# Patient Record
Sex: FEMALE | Race: WHITE | NOT HISPANIC OR LATINO | Employment: OTHER | URBAN - NONMETROPOLITAN AREA
[De-identification: names, ages, dates, MRNs, and addresses within clinical notes are randomized per-mention and may not be internally consistent; named-entity substitution may affect disease eponyms.]

---

## 2017-01-23 RX ORDER — LOVASTATIN 10 MG/1
10 TABLET ORAL NIGHTLY
Qty: 90 TABLET | Refills: 1 | Status: SHIPPED | OUTPATIENT
Start: 2017-01-23 | End: 2017-09-06 | Stop reason: SINTOL

## 2017-04-12 ENCOUNTER — OFFICE VISIT (OUTPATIENT)
Dept: ORTHOPEDIC SURGERY | Facility: CLINIC | Age: 67
End: 2017-04-12

## 2017-04-12 VITALS — WEIGHT: 175 LBS | BODY MASS INDEX: 28.12 KG/M2 | HEIGHT: 66 IN

## 2017-04-12 DIAGNOSIS — IMO0001 VERTEBRAL COMPRESSION FRACTURE, INITIAL ENCOUNTER: Primary | ICD-10-CM

## 2017-04-12 DIAGNOSIS — M54.5 ACUTE LOW BACK PAIN, UNSPECIFIED BACK PAIN LATERALITY, WITH SCIATICA PRESENCE UNSPECIFIED: ICD-10-CM

## 2017-04-12 PROBLEM — M48.50XA VERTEBRAL COMPRESSION FRACTURE (HCC): Status: ACTIVE | Noted: 2017-04-12

## 2017-04-12 PROBLEM — M54.50 ACUTE LOW BACK PAIN: Status: ACTIVE | Noted: 2017-04-12

## 2017-04-12 PROCEDURE — 99202 OFFICE O/P NEW SF 15 MIN: CPT | Performed by: ORTHOPAEDIC SURGERY

## 2017-04-12 NOTE — PROGRESS NOTES
Subjective   Karey Cazares is a 67 y.o.  y.o. female    Chief Complaint   Patient presents with   • Lumbar Spine - Establish Care, Pain   • Results     xrays @        Fall   The accident occurred 3 to 5 days ago. The fall occurred from a ladder. She fell from a height of 3 to 5 ft. There was no blood loss. Point of impact: back. Pain location: back. The pain is at a severity of 7/10. The pain is moderate. The symptoms are aggravated by sitting and standing. Pertinent negatives include no abdominal pain, fever, headaches, hematuria, nausea or numbness. Associated symptoms comments: Sharp pain with burning. She has tried acetaminophen and rest for the symptoms. The treatment provided mild relief.     Fell, injured back.        Review of Systems   Constitutional: Negative for appetite change, chills, diaphoresis, fatigue, fever and unexpected weight change.   HENT: Negative.  Negative for congestion, dental problem, facial swelling, hearing loss, nosebleeds, postnasal drip, trouble swallowing and voice change.    Eyes: Negative.  Negative for pain, discharge, redness and itching.   Respiratory: Negative.  Negative for cough, choking, chest tightness, shortness of breath, wheezing and stridor.    Cardiovascular: Negative.  Negative for chest pain, palpitations and leg swelling.   Gastrointestinal: Negative.  Negative for abdominal pain, blood in stool, constipation, diarrhea and nausea.   Endocrine: Negative.  Negative for cold intolerance and heat intolerance.   Genitourinary: Negative.  Negative for dysuria, frequency, hematuria and urgency.   Musculoskeletal: Positive for back pain. Negative for gait problem, joint swelling, neck pain and neck stiffness.   Skin: Negative.  Negative for pallor and rash.   Allergic/Immunologic: Negative.    Neurological: Negative for syncope, facial asymmetry, speech difficulty, weakness, numbness and headaches.   Hematological: Negative.    Psychiatric/Behavioral: Negative.   "Negative for agitation, behavioral problems, confusion, decreased concentration, dysphoric mood and hallucinations. The patient is not nervous/anxious and is not hyperactive.    All other systems reviewed and are negative.      Allergies   Allergen Reactions   • Erythromycin Base    • Keflex [Cephalexin] Rash   • Lortab [Hydrocodone-Acetaminophen] Rash   • Orudis [Ketoprofen] Rash   • Other Rash     Phenobarbital and Darvocet-N 100   • Sulfa Antibiotics Rash         Current Outpatient Prescriptions:   •  aspirin 81 MG tablet, Take 81 mg by mouth daily., Disp: , Rfl:   •  azelastine (OPTIVAR) 0.05 % ophthalmic solution, Administer 1 drop to both eyes 2 (two) times a day., Disp: , Rfl:   •  Ergocalciferol 2000 UNITS tablet, Take 1 tablet by mouth daily., Disp: , Rfl:   •  lovastatin (MEVACOR) 10 MG tablet, Take 1 tablet by mouth Every Night., Disp: 90 tablet, Rfl: 1  •  Zinc Acetate (GALZIN) 25 MG capsule, Take 1 capsule by mouth daily., Disp: , Rfl:     Past Surgical History:   Procedure Laterality Date   • INJECTION OF MEDICATION  01/17/2016    Dexamethasone   • INJECTION OF MEDICATION  01/17/2016    INJECTION FOR NEUROMA   • INJECTION OF MEDICATION      kenalog x2- preformed on 01/17/2016 and 01/20/2011       Past Medical History:   Diagnosis Date   • Acute allergic reaction    • Acute otitis externa    • Acute sinusitis    • Acute urinary tract infection    • Allergic conjunctivitis    • Closed fracture of foot    • Cough    • Encounter for immunization    • Exanthematous disorder    • H/O screening mammography 03/12/2015   • Hyperlipidemia    • Kellogg's metatarsalgia    • Pain in lower limb    • Pain in throat    • Screening for osteoporosis 03/12/2015   • Upper respiratory infection    • Urinary tract infectious disease    • Viral disease    • Vitamin D deficiency        Vitals:    04/12/17 1010   Weight: 175 lb (79.4 kg)   Height: 66\" (167.6 cm)       Objective     Physical Exam   Musculoskeletal:        " Thoracic back: She exhibits decreased range of motion and tenderness. She exhibits no bony tenderness, no swelling, no edema, no deformity, no laceration, no pain, no spasm and normal pulse.        Lumbar back: She exhibits tenderness. She exhibits no bony tenderness, no swelling, no edema, no deformity, no laceration, no pain and no spasm.           Assessment:  Karey was seen today for results, establish care and pain.    Diagnoses and all orders for this visit:    Acute low back pain, unspecified back pain laterality, with sciatica presence unspecified    Vertebral compression fracture, initial encounter    5views lumbar spine impression:   1. Generalized decreased bony minerlization with a mild compression deformity at [id number] in 30% vertical krystyna loss. An acute etiology is suspected given the patients history.  2. Levoscoliosis of the lower thoracic and upper lumbar spine.    Plan:    Active Ambulatory Problems     Diagnosis Date Noted   • Acute low back pain 04/12/2017   • Vertebral compression fracture 04/12/2017     Resolved Ambulatory Problems     Diagnosis Date Noted   • No Resolved Ambulatory Problems     Past Medical History:   Diagnosis Date   • Acute allergic reaction    • Acute otitis externa    • Acute sinusitis    • Acute urinary tract infection    • Allergic conjunctivitis    • Closed fracture of foot    • Cough    • Encounter for immunization    • Exanthematous disorder    • H/O screening mammography 03/12/2015   • Hyperlipidemia    • Kellogg's metatarsalgia    • Pain in lower limb    • Pain in throat    • Screening for osteoporosis 03/12/2015   • Upper respiratory infection    • Urinary tract infectious disease    • Viral disease    • Vitamin D deficiency        I discussed treatment options.  Bracing is an option, I explained to her the necessity for bracing, which she will consider.          Signed by, Kev Liang MD

## 2017-04-14 ENCOUNTER — TELEPHONE (OUTPATIENT)
Dept: ORTHOPEDIC SURGERY | Facility: CLINIC | Age: 67
End: 2017-04-14

## 2017-04-14 NOTE — TELEPHONE ENCOUNTER
Patient has received the brace you ordered is too restrictive. She is unable to even go the restroom with out help. She can not wear it. Can you order a different one.

## 2017-04-17 NOTE — TELEPHONE ENCOUNTER
Any other brace I may order would be the same thing.  I'm not going to waste more money for her to decide she doesn't want to wear it.

## 2017-05-12 ENCOUNTER — OFFICE VISIT (OUTPATIENT)
Dept: ORTHOPEDIC SURGERY | Facility: CLINIC | Age: 67
End: 2017-05-12

## 2017-05-12 VITALS — WEIGHT: 175 LBS | BODY MASS INDEX: 28.12 KG/M2 | HEIGHT: 66 IN

## 2017-05-12 DIAGNOSIS — IMO0001 VERTEBRAL COMPRESSION FRACTURE, WITH ROUTINE HEALING, SUBSEQUENT ENCOUNTER: Primary | ICD-10-CM

## 2017-05-12 DIAGNOSIS — M54.5 ACUTE LOW BACK PAIN, UNSPECIFIED BACK PAIN LATERALITY, WITH SCIATICA PRESENCE UNSPECIFIED: ICD-10-CM

## 2017-05-12 PROCEDURE — 99213 OFFICE O/P EST LOW 20 MIN: CPT | Performed by: ORTHOPAEDIC SURGERY

## 2017-07-05 DIAGNOSIS — Z11.59 NEED FOR HEPATITIS C SCREENING TEST: ICD-10-CM

## 2017-07-05 DIAGNOSIS — E78.2 MIXED HYPERLIPIDEMIA: Primary | ICD-10-CM

## 2017-09-06 ENCOUNTER — OFFICE VISIT (OUTPATIENT)
Dept: FAMILY MEDICINE CLINIC | Facility: CLINIC | Age: 67
End: 2017-09-06

## 2017-09-06 ENCOUNTER — LAB (OUTPATIENT)
Dept: LAB | Facility: HOSPITAL | Age: 67
End: 2017-09-06

## 2017-09-06 VITALS
BODY MASS INDEX: 27.85 KG/M2 | HEIGHT: 66 IN | DIASTOLIC BLOOD PRESSURE: 78 MMHG | SYSTOLIC BLOOD PRESSURE: 135 MMHG | HEART RATE: 83 BPM | OXYGEN SATURATION: 98 % | WEIGHT: 173.3 LBS

## 2017-09-06 DIAGNOSIS — Z00.00 MEDICARE ANNUAL WELLNESS VISIT, INITIAL: Primary | ICD-10-CM

## 2017-09-06 DIAGNOSIS — E78.2 MIXED HYPERLIPIDEMIA: ICD-10-CM

## 2017-09-06 DIAGNOSIS — E55.9 VITAMIN D DEFICIENCY: Chronic | ICD-10-CM

## 2017-09-06 DIAGNOSIS — IMO0001 VERTEBRAL COMPRESSION FRACTURE, WITH ROUTINE HEALING, SUBSEQUENT ENCOUNTER: ICD-10-CM

## 2017-09-06 DIAGNOSIS — Z11.59 NEED FOR HEPATITIS C SCREENING TEST: ICD-10-CM

## 2017-09-06 DIAGNOSIS — E78.2 MIXED HYPERLIPIDEMIA: Chronic | ICD-10-CM

## 2017-09-06 LAB
ALBUMIN SERPL-MCNC: 4.6 G/DL (ref 3.4–4.8)
ALBUMIN/GLOB SERPL: 1.4 G/DL (ref 1.1–1.8)
ALP SERPL-CCNC: 68 U/L (ref 38–126)
ALT SERPL W P-5'-P-CCNC: 51 U/L (ref 9–52)
ANION GAP SERPL CALCULATED.3IONS-SCNC: 12 MMOL/L (ref 5–15)
ARTICHOKE IGE QN: 180 MG/DL (ref 1–129)
AST SERPL-CCNC: 41 U/L (ref 14–36)
BACTERIA UR QL AUTO: ABNORMAL /HPF
BILIRUB SERPL-MCNC: 0.6 MG/DL (ref 0.2–1.3)
BILIRUB UR QL STRIP: NEGATIVE
BUN BLD-MCNC: 17 MG/DL (ref 7–21)
BUN/CREAT SERPL: 17 (ref 7–25)
CALCIUM SPEC-SCNC: 9.7 MG/DL (ref 8.4–10.2)
CHLORIDE SERPL-SCNC: 103 MMOL/L (ref 95–110)
CHOLEST SERPL-MCNC: 292 MG/DL (ref 0–199)
CLARITY UR: CLEAR
CO2 SERPL-SCNC: 27 MMOL/L (ref 22–31)
COLOR UR: YELLOW
CREAT BLD-MCNC: 1 MG/DL (ref 0.5–1)
GFR SERPL CREATININE-BSD FRML MDRD: 55 ML/MIN/1.73 (ref 45–104)
GLOBULIN UR ELPH-MCNC: 3.2 GM/DL (ref 2.3–3.5)
GLUCOSE BLD-MCNC: 102 MG/DL (ref 60–100)
GLUCOSE UR STRIP-MCNC: NEGATIVE MG/DL
HCV AB SER DONR QL: NEGATIVE
HDLC SERPL-MCNC: 62 MG/DL (ref 60–200)
HGB UR QL STRIP.AUTO: NEGATIVE
HYALINE CASTS UR QL AUTO: ABNORMAL /LPF
KETONES UR QL STRIP: NEGATIVE
LDLC/HDLC SERPL: 3.01 {RATIO} (ref 0–3.22)
LEUKOCYTE ESTERASE UR QL STRIP.AUTO: NEGATIVE
NITRITE UR QL STRIP: NEGATIVE
PH UR STRIP.AUTO: 5.5 [PH] (ref 5–9)
POTASSIUM BLD-SCNC: 4.8 MMOL/L (ref 3.5–5.1)
PROT SERPL-MCNC: 7.8 G/DL (ref 6.3–8.6)
PROT UR QL STRIP: NEGATIVE
RBC # UR: ABNORMAL /HPF
REF LAB TEST METHOD: ABNORMAL
SODIUM BLD-SCNC: 142 MMOL/L (ref 137–145)
SP GR UR STRIP: 1.02 (ref 1–1.03)
SQUAMOUS #/AREA URNS HPF: ABNORMAL /HPF
TRIGL SERPL-MCNC: 218 MG/DL (ref 20–199)
UROBILINOGEN UR QL STRIP: NORMAL
WBC UR QL AUTO: ABNORMAL /HPF

## 2017-09-06 PROCEDURE — 81001 URINALYSIS AUTO W/SCOPE: CPT | Performed by: GENERAL PRACTICE

## 2017-09-06 PROCEDURE — 80061 LIPID PANEL: CPT | Performed by: GENERAL PRACTICE

## 2017-09-06 PROCEDURE — 80053 COMPREHEN METABOLIC PANEL: CPT | Performed by: GENERAL PRACTICE

## 2017-09-06 PROCEDURE — 86803 HEPATITIS C AB TEST: CPT | Performed by: GENERAL PRACTICE

## 2017-09-06 PROCEDURE — G0438 PPPS, INITIAL VISIT: HCPCS | Performed by: GENERAL PRACTICE

## 2017-09-06 PROCEDURE — 36415 COLL VENOUS BLD VENIPUNCTURE: CPT

## 2017-09-06 PROCEDURE — 99214 OFFICE O/P EST MOD 30 MIN: CPT | Performed by: GENERAL PRACTICE

## 2017-09-06 RX ORDER — DIAPER,BRIEF,ADULT, DISPOSABLE
EACH MISCELLANEOUS
COMMUNITY
End: 2018-09-07

## 2017-09-06 RX ORDER — PRAVASTATIN SODIUM 20 MG
20 TABLET ORAL NIGHTLY
Qty: 90 TABLET | Refills: 3 | Status: SHIPPED | OUTPATIENT
Start: 2017-09-06 | End: 2018-09-07 | Stop reason: SDUPTHER

## 2017-09-06 RX ORDER — ASPIRIN 325 MG
650 TABLET ORAL DAILY
COMMUNITY
End: 2018-09-07 | Stop reason: ALTCHOICE

## 2017-09-06 NOTE — PROGRESS NOTES
QUICK REFERENCE INFORMATION:  The ABCs of the Annual Wellness Visit    Initial Medicare Wellness Visit    HEALTH RISK ASSESSMENT    1950    Recent Hospitalizations:  No hospitalization(s) within the last year..    Current Medical Providers:  Patient Care Team:  Tatiana Paredes MD as PCP - General  Hosea Srinivasan OD (Optometry)    Smoking Status:  History   Smoking Status   • Never Smoker   Smokeless Tobacco   • Never Used       Alcohol Consumption:  History   Alcohol Use No       Depression Screen:   PHQ-2/PHQ-9 Depression Screening 9/6/2017   Little interest or pleasure in doing things 0   Feeling down, depressed, or hopeless 1   Trouble falling or staying asleep, or sleeping too much 0   Feeling tired or having little energy 0   Poor appetite or overeating 1   Feeling bad about yourself - or that you are a failure or have let yourself or your family down 0   Trouble concentrating on things, such as reading the newspaper or watching television 0   Moving or speaking so slowly that other people could have noticed. Or the opposite - being so fidgety or restless that you have been moving around a lot more than usual 0   Thoughts that you would be better off dead, or of hurting yourself in some way 0   Total Score 2   If you checked off any problems, how difficult have these problems made it for you to do your work, take care of things at home, or get along with other people? Not difficult at all       Health Habits and Functional and Cognitive Screening:  Functional & Cognitive Status 9/6/2017   Do you have difficulty preparing food and eating? No   Do you have difficulty bathing yourself? No   Do you have difficulty getting dressed? No   Do you have difficulty using the toilet? No   Do you have difficulty moving around from place to place? No   In the past year have you fallen or experienced a near fall? Yes   Do you need help using the phone?  No   Are you deaf or do you have serious difficulty hearing?   No   Do you need help with transportation? No   Do you need help shopping? No   Do you need help preparing meals?  No   Do you need help with housework?  No   Do you need help with laundry? No   Do you need help taking your medications? No   Do you need help managing money? No   Do you have difficulty concentrating, remembering or making decisions? No       Health Habits  Current Diet: Well Balanced Diet  Dental Exam: Unknown  Eye Exam: Up to date  Exercise (times per week): 0 times per week      Does the patient have evidence of cognitive impairment? No    Asiprin use counseling: Taking ASA appropriately as indicated      Recent Lab Results:    Visual Acuity:  No exam data present    Age-appropriate Screening Schedule:  Refer to the list below for future screening recommendations based on patient's age, sex and/or medical conditions. Orders for these recommended tests are listed in the plan section. The patient has been provided with a written plan.    Health Maintenance   Topic Date Due   • COLONOSCOPY  11/16/2017   • MAMMOGRAM  03/17/2018   • LIPID PANEL  09/06/2018   • TDAP/TD VACCINES (2 - Td) 08/02/2027   • INFLUENZA VACCINE  Completed   • PNEUMOCOCCAL VACCINES (65+ LOW/MEDIUM RISK)  Completed   • ZOSTER VACCINE  Completed        Subjective   History of Present Illness    Karey Cazares is a 67 y.o. female who presents for an Annual Wellness Visit.    The following portions of the patient's history were reviewed and updated as appropriate: allergies, current medications, past family history, past medical history, past social history, past surgical history and problem list.    Outpatient Medications Prior to Visit   Medication Sig Dispense Refill   • azelastine (OPTIVAR) 0.05 % ophthalmic solution Administer 1 drop to both eyes 2 (two) times a day.     • Ergocalciferol 2000 UNITS tablet Take 1 tablet by mouth daily.     • IBUPROFEN IB PO Take  by mouth.     • Zinc Acetate (GALZIN) 25 MG capsule Take 1  "capsule by mouth daily.     • aspirin 81 MG tablet Take 81 mg by mouth daily.     • lovastatin (MEVACOR) 10 MG tablet Take 1 tablet by mouth Every Night. 90 tablet 1     No facility-administered medications prior to visit.        Patient Active Problem List   Diagnosis   • Acute low back pain   • Vertebral compression fracture   • Mixed hyperlipidemia       Advance Care Planning:  has an advance directive - a copy has been provided and is in file    Identification of Risk Factors:  Risk factors include: weight , unhealthy diet, inactivity and chronic pain.    Review of Systems    Compared to one year ago, the patient feels her physical health is worse.  Compared to one year ago, the patient feels her mental health is the same.    Objective     Physical Exam   Constitutional: She is oriented to person, place, and time. She appears well-developed and well-nourished. No distress.   HENT:   Head: Normocephalic and atraumatic.   Nose: Nose normal.   Mouth/Throat: Oropharynx is clear and moist.   Eyes: Conjunctivae and EOM are normal. Pupils are equal, round, and reactive to light. Right eye exhibits no discharge. Left eye exhibits no discharge.   Neck: No thyromegaly present.   Cardiovascular: Normal rate, regular rhythm, normal heart sounds and intact distal pulses.    Pulmonary/Chest: Effort normal and breath sounds normal.   Lymphadenopathy:     She has no cervical adenopathy.   Neurological: She is alert and oriented to person, place, and time.   Skin: Skin is warm and dry.   Psychiatric: She has a normal mood and affect.   Nursing note and vitals reviewed.      Vitals:    09/06/17 0853   BP: 135/78   BP Location: Left arm   Patient Position: Sitting   Cuff Size: Adult   Pulse: 83   SpO2: 98%   Weight: 173 lb 4.8 oz (78.6 kg)   Height: 66\" (167.6 cm)   PainSc:   2   PainLoc: Back       Body mass index is 27.97 kg/(m^2).  Discussed the patient's BMI with her. The BMI is above average; BMI management plan is " completed.    Assessment/Plan   Patient Self-Management and Personalized Health Advice  The patient has been provided with information about: diet, exercise and weight management and preventive services including:   · Exercise counseling provided, Fall Risk assessment done.    Visit Diagnoses:    ICD-10-CM ICD-9-CM   1. Medicare annual wellness visit, initial Z00.00 V70.0   2. Mixed hyperlipidemia E78.2 272.2   3. Vertebral compression fracture, with routine healing, subsequent encounter M48.50XD V54.27   4. Vitamin D deficiency E55.9 268.9       No orders of the defined types were placed in this encounter.      Outpatient Encounter Prescriptions as of 9/6/2017   Medication Sig Dispense Refill   • aspirin 325 MG tablet Take 650 mg by mouth Daily.     • azelastine (OPTIVAR) 0.05 % ophthalmic solution Administer 1 drop to both eyes 2 (two) times a day.     • B Complex-C (SUPER B COMPLEX PO) Take 1 tablet by mouth.     • Ergocalciferol 2000 UNITS tablet Take 1 tablet by mouth daily.     • IBUPROFEN IB PO Take  by mouth.     • Lecithin 1200 MG capsule Take  by mouth.     • Zinc Acetate (GALZIN) 25 MG capsule Take 1 capsule by mouth daily.     • pravastatin (PRAVACHOL) 20 MG tablet Take 1 tablet by mouth Every Night. 90 tablet 3   • [DISCONTINUED] aspirin 81 MG tablet Take 81 mg by mouth daily.     • [DISCONTINUED] lovastatin (MEVACOR) 10 MG tablet Take 1 tablet by mouth Every Night. 90 tablet 1     No facility-administered encounter medications on file as of 9/6/2017.        Reviewed use of high risk medication in the elderly: not applicable  Reviewed for potential of harmful drug interactions in the elderly: not applicable    Follow Up:  Return in about 1 year (around 9/6/2018) for Annual physical, medicare wellness visit.     An After Visit Summary and PPPS with all of these plans were given to the patient.   Information has been scanned into chart.  Discussed importance of taking medications as prescribed. Encouraged  healthy eating habits with low fat, low salt choices and working towards maintaining a healthy weight. Recommended regular exercise if able as well as care to prevent falls.

## 2017-09-06 NOTE — PATIENT INSTRUCTIONS
Medicare Wellness  Personal Prevention Plan of Service     Date of Office Visit:  2017  Encounter Provider:  Tatiana Paredes MD  Place of Service:  [facility]  Patient Name: Karey Cazares  :  1950    As part of the Medicare Wellness portion of your visit today, we are providing you with this personalized preventive plan of services (PPPS). This plan is based upon recommendations of the [place] Preventive Services Task Force (USPSTF) and the Advisory Committee on Immunization Practices (ACIP).    This lists the preventive care services that should be considered, and provides dates of when you are due. Items listed as completed are up-to-date and do not require any further intervention.    Health Maintenance   Topic Date Due   • COLONOSCOPY  2017   • MAMMOGRAM  2018   • MEDICARE ANNUAL WELLNESS  2018   • LIPID PANEL  2018   • TDAP/TD VACCINES (2 - Td) 2027   • HEPATITIS C SCREENING  Completed   • INFLUENZA VACCINE  Completed   • PNEUMOCOCCAL VACCINES (65+ LOW/MEDIUM RISK)  Completed   • ZOSTER VACCINE  Completed       No orders of the defined types were placed in this encounter.      Return in about 1 year (around 2018) for Annual physical, medicare wellness visit.

## 2017-09-06 NOTE — PROGRESS NOTES
Subjective   Karey Cazares is a 67 y.o. female.     Chief Complaint   Patient presents with   • Annual Exam   • Back Pain       History of Present Illness     For review and evaluation of management of chronic medical problems. Labs pending. Due for mammogram. Stopped taking her lovastatin as she thought it was causing some foot pain. Has chronic low back pain related to an L1 compression fracture after falling from a ladder in April. Takes ibuprofen.     The following portions of the patient's history were reviewed and updated as appropriate: allergies, current medications, past family and social history and problem list.    Outpatient Medications Prior to Visit   Medication Sig Dispense Refill   • azelastine (OPTIVAR) 0.05 % ophthalmic solution Administer 1 drop to both eyes 2 (two) times a day.     • Ergocalciferol 2000 UNITS tablet Take 1 tablet by mouth daily.     • IBUPROFEN IB PO Take  by mouth.     • Zinc Acetate (GALZIN) 25 MG capsule Take 1 capsule by mouth daily.     • aspirin 81 MG tablet Take 81 mg by mouth daily.     • lovastatin (MEVACOR) 10 MG tablet Take 1 tablet by mouth Every Night. 90 tablet 1     No facility-administered medications prior to visit.      Review of Systems   Constitutional: Negative.  Negative for chills, fatigue, fever and unexpected weight change.   HENT: Negative.  Negative for congestion, ear pain, hearing loss, nosebleeds, rhinorrhea, sneezing, sore throat and tinnitus.    Eyes: Negative.  Negative for discharge.   Respiratory: Negative.  Negative for cough, shortness of breath and wheezing.    Cardiovascular: Negative.  Negative for chest pain and palpitations.   Gastrointestinal: Negative.  Negative for abdominal pain, constipation, diarrhea, nausea and vomiting.   Endocrine: Negative.    Genitourinary: Negative.  Negative for dysuria, frequency and urgency.   Musculoskeletal: Positive for back pain. Negative for arthralgias, joint swelling, myalgias and neck pain.  "  Skin: Negative.  Negative for rash.   Allergic/Immunologic: Negative.    Neurological: Negative.  Negative for dizziness, weakness, numbness and headaches.   Hematological: Negative.  Negative for adenopathy.   Psychiatric/Behavioral: Negative.  Negative for dysphoric mood and sleep disturbance. The patient is not nervous/anxious.      Objective     Visit Vitals   • /78 (BP Location: Left arm, Patient Position: Sitting, Cuff Size: Adult)   • Pulse 83   • Ht 66\" (167.6 cm)   • Wt 173 lb 4.8 oz (78.6 kg)   • SpO2 98%   • BMI 27.97 kg/m2     Physical Exam   Constitutional: She is oriented to person, place, and time. She appears well-developed and well-nourished. No distress.   HENT:   Head: Normocephalic and atraumatic.   Nose: Nose normal.   Mouth/Throat: Oropharynx is clear and moist.   Eyes: Conjunctivae and EOM are normal. Pupils are equal, round, and reactive to light. Right eye exhibits no discharge. Left eye exhibits no discharge.   Neck: No tracheal deviation present. No thyromegaly present.   Cardiovascular: Normal rate, regular rhythm, normal heart sounds and intact distal pulses.    No murmur heard.  Pulmonary/Chest: Effort normal and breath sounds normal. No respiratory distress. She has no wheezes. She has no rales. She exhibits no tenderness. Right breast exhibits no inverted nipple, no mass, no nipple discharge, no skin change and no tenderness. Left breast exhibits no inverted nipple, no mass, no nipple discharge, no skin change and no tenderness.   Abdominal: Soft. Bowel sounds are normal. She exhibits no distension and no mass. There is no tenderness. No hernia.   Musculoskeletal: She exhibits no deformity.        Lumbar back: She exhibits decreased range of motion and tenderness.   Lymphadenopathy:     She has no cervical adenopathy.   Neurological: She is alert and oriented to person, place, and time. She has normal reflexes.   Skin: Skin is warm and dry.   Psychiatric: She has a normal mood " and affect. Her behavior is normal. Judgment and thought content normal.   Nursing note and vitals reviewed.     Assessment/Plan   Problem List Items Addressed This Visit        Cardiovascular and Mediastinum    Mixed hyperlipidemia (Chronic)    Relevant Medications    pravastatin (PRAVACHOL) 20 MG tablet       Musculoskeletal and Integument    Vertebral compression fracture      Other Visit Diagnoses     Medicare annual wellness visit, initial    -  Primary    Vitamin D deficiency  (Chronic)             Will notify regarding results. Try pravachol for cholesterol.    New Medications Ordered This Visit   Medications   • B Complex-C (SUPER B COMPLEX PO)     Sig: Take 1 tablet by mouth.   • aspirin 325 MG tablet     Sig: Take 650 mg by mouth Daily.   • Lecithin 1200 MG capsule     Sig: Take  by mouth.   • pravastatin (PRAVACHOL) 20 MG tablet     Sig: Take 1 tablet by mouth Every Night.     Dispense:  90 tablet     Refill:  3     Return in about 1 year (around 9/6/2018) for Annual physical, medicare wellness visit.

## 2017-09-07 ENCOUNTER — TELEPHONE (OUTPATIENT)
Dept: FAMILY MEDICINE CLINIC | Facility: CLINIC | Age: 67
End: 2017-09-07

## 2017-09-07 NOTE — TELEPHONE ENCOUNTER
Pr Dr. Paredes, Ms. Cazares has been called with her recent lab results & recommendations.  Continue her current medications and follow-up as planned or sooner if any problems.      ----- Message from Tatiana Paredes MD sent at 9/6/2017  3:56 PM CDT -----  Call and tell labs are okay except her cholesterol is really high, start cholesterol medication as prescribed.

## 2017-09-07 NOTE — PROGRESS NOTES
Pr Dr. Paredes, Ms. Sage has been called with her recent lab results & recommendations.  Continue her current medications and follow-up as planned or sooner if any problems.

## 2017-09-21 DIAGNOSIS — M51.36 DEGENERATIVE DISC DISEASE, LUMBAR: ICD-10-CM

## 2017-09-21 DIAGNOSIS — IMO0001 VERTEBRAL COMPRESSION FRACTURE, WITH ROUTINE HEALING, SUBSEQUENT ENCOUNTER: Primary | ICD-10-CM

## 2017-10-02 ENCOUNTER — HOSPITAL ENCOUNTER (OUTPATIENT)
Dept: PHYSICAL THERAPY | Facility: HOSPITAL | Age: 67
Setting detail: THERAPIES SERIES
Discharge: HOME | End: 2017-10-02

## 2017-10-02 DIAGNOSIS — M48.50XD WEDGING OF VERTEBRA WITH ROUTINE HEALING, SUBSEQUENT ENCOUNTER: Primary | ICD-10-CM

## 2017-10-02 PROCEDURE — 97140 MANUAL THERAPY 1/> REGIONS: CPT

## 2017-10-02 PROCEDURE — G8978 MOBILITY CURRENT STATUS: HCPCS

## 2017-10-02 PROCEDURE — 97161 PT EVAL LOW COMPLEX 20 MIN: CPT

## 2017-10-02 PROCEDURE — G8979 MOBILITY GOAL STATUS: HCPCS

## 2017-10-02 NOTE — THERAPY EVALUATION
Outpatient Physical Therapy Ortho Initial Evaluation  [facility]     Patient Name: Karey Cazares  : 1950  MRN: [id number]  Today's Date: 10/2/2017      Visit Date: 10/02/2017  Subjective Improvement: 0%  Visit Number: 1  Insurance approval: Medicare MD Visit: PRN  Recert Date: 10/23/2017      Therapy Diagnosis: Chronic LBP secondary to L1 Compression Fx with Non-Surgical Management (2017)  Patient Active Problem List   Diagnosis   • Acute low back pain   • Vertebral compression fracture   • Mixed hyperlipidemia        Past Medical History:   Diagnosis Date   • Acute allergic reaction    • Acute otitis externa    • Acute sinusitis    • Acute urinary tract infection    • Allergic conjunctivitis    • Closed fracture of foot    • Cough    • Encounter for immunization    • Exanthematous disorder    • H/O screening mammography 2015   • Hyperlipidemia    • Kellogg's metatarsalgia    • Pain in lower limb    • Pain in throat    • Screening for osteoporosis 2015   • Upper respiratory infection    • Urinary tract infectious disease    • Viral disease    • Vitamin D deficiency         Past Surgical History:   Procedure Laterality Date   • INJECTION OF MEDICATION  2016    Dexamethasone   • INJECTION OF MEDICATION  2016    INJECTION FOR NEUROMA   • INJECTION OF MEDICATION      kenalog x2- preformed on 2016 and 2011       Visit Dx:     ICD-10-CM ICD-9-CM   1. Wedging of vertebra with routine healing, subsequent encounter M48.50XD [id number]             Patient History       10/02/17 1100          History    Chief Complaint Pain  -AK      Type of Pain Back pain  -AK      Date Current Problem(s) Began 17  -AK      Brief Description of Current Complaint pt c/o chronic LBP s/p compression fx after falling off of a ladder on 2017. Pt has L1 compression fx with non-surgical management; pt describes pain as constant yet varying in intensity, burning and spreading from  mid back distally into toes and proximally into neck and UEs. Pt's pain interrupts sleep and makes prolonged standing, walking activities difficult.  -AK      Patient's Rating of General Health Good  -AK      Hand Dominance right-handed  -AK      Occupation/sports/leisure activities Retired  -AK        User Key  (r) = Recorded By, (t) = Taken By, (c) = Cosigned By    Initials Name Provider Type    POLLO Solares PT Physical Therapist                PT Ortho       10/02/17 1100    Precautions and Contraindications    Precautions/Limitations no known precautions/limitations  -AK    Subjective Pain    Able to rate subjective pain? yes  -AK    Pre-Treatment Pain Level 3  -AK    Post-Treatment Pain Level 2  -AK    Subjective Pain Comment back  -AK    Posture/Observations    Posture/Observations Comments Pt presents with exagerrated thoracic kyphosis, forawrd head position and protracted B shoulders. B Paraspinals and R Quadratus Lumborum are Hypertonic, significant myofscial restriction and Trigger Points palpated throughtout thoraco-lumbar spinal musculature. Ely's Test: L=34 degrees, R=36 degrees. B Iliopsoas muscles tight and painful to testing.  -AK    ROM (Range of Motion)    General ROM Detail Lumbar: Flexion=65 degrees, extension=10 degrees, L Sidebending=14 degrees, R Sidebending=16 degrees, L Rotation=25 degress, R Rotation=54 degrees.  -AK    MMT (Manual Muscle Testing)    General MMT Assessment Detail L LE: Hip Flexion=4=/5, Hip Extension=3/5, Hip Abduction=4+/5, Knee Flexion+Extension=4+/5, Ankle DF+PF=5/5. R LE: Hip Flexion=4/5, Hip Extension=3/5, Hip Abduction=4+/5, Knee Flexion=4/5, Knee extension=4+/5, Ankle DF+PF=5/5.  -AK      User Key  (r) = Recorded By, (t) = Taken By, (c) = Cosigned By    Initials Name Provider Type    POLLO Solares, PT Physical Therapist                                PT OP Goals       10/02/17 1100       PT Short Term Goals    STG Date to Achieve 10/23/17  -AK     STG 1 Pt  will have no greater than 3/10 pain in lower back at all times  -AK     STG 2 Pt will have 5 degree or greater decrease in lumbar ROM deficits  -AK     STG 3 Pt will be able to sleep for 4 hour intervals without interruption from pain symptoms.  -AK     STG 4 Pt will have 4/5 or greater strength in B LEs in all regards  -AK     Long Term Goals    LTG Date to Achieve 11/06/17  -AK     LTG 1 Pt will have no gretaer than 1/10 pain symptoms in lower back at all times.  -AK     LTG 2 Pt will have lumbar ROM WFL  -AK     LTG 3 Pt will be able to sleep for 6 or more hour intervals without interruption from pain symptoms.  -AK     LTG 4 Pt will have 4+/5 strength in B LEs in all regards  -AK     LTG 5 Pt will have 10 point or greater improvement in Modified Oswestry score.  -AK     Time Calculation    PT Goal Re-Cert Due Date 10/23/17  -AK       User Key  (r) = Recorded By, (t) = Taken By, (c) = Cosigned By    Initials Name Provider Type    AK Ciro Solares, PT Physical Therapist                PT Assessment/Plan       10/02/17 1242       PT Assessment    Functional Limitations Decreased safety during functional activities;Impaired gait;Limitation in home management  -AK     Impairments Balance;Coordination;Range of motion;Muscle strength;Sensation;Pain;Gait;Posture;Poor body mechanics  -AK     Assessment Comments Pt will benefit from skilled PT intervention addressing deficits in posture, strength, ROM, standing dynamic balance, ambulation endurance and atenuation of pain symptoms.  -AK     Rehab Potential Good  -AK     Patient/caregiver participated in establishment of treatment plan and goals Yes  -AK     Patient would benefit from skilled therapy intervention Yes  -AK     PT Plan    PT Frequency 2x/week  -AK     Predicted Duration of Therapy Intervention (days/wks) 5 weeks  -AK     Planned CPT's? PT EVAL LOW COMPLEXITY: 21247;PT THER PROC EA 15 MIN: 22206;PT RE-EVAL: 66802;PT THER ACT EA 15 MIN: 66658;PT AQUATIC  THERAPY EA 15 MIN: 55816;PT SELF CARE/MGMT/TRAIN 15 MIN: 28327;PT THER SUPP EA 15 MIN;PT MANUAL THERAPY EA 15 MIN: 31523;PT NEUROMUSC RE-EDUCATION EA 15 MIN: 36152;PT TRACTION LUMBAR: 82830;PT HOT OR COLD PACK TREAT MCARE;PT ELECTRICAL STIM UNATTEND:   -AK     Physical Therapy Interventions (Optional Details) aquatics exercise;balance training;dry needling;gross motor skills;home exercise program;joint mobilization;postural re-education;patient/family education;neuromuscular re-education;motor coordination training;modalities;manual therapy techniques;lumbar stabilization;ROM (Range of Motion);strengthening;stretching  -AK       User Key  (r) = Recorded By, (t) = Taken By, (c) = Cosigned By    Initials Name Provider Type    POLLO Solares, PT Physical Therapist                Modalities       10/02/17 1100          Moist Heat    MH Applied Yes  -AK      Location Thoracolumbar spine during stretches  -AK      Rx Minutes 10 mins  -AK        User Key  (r) = Recorded By, (t) = Taken By, (c) = Cosigned By    Initials Name Provider Type    POLLO Solares, PT Physical Therapist              Exercises       10/02/17 1100          Subjective Pain    Able to rate subjective pain? yes  -AK      Pre-Treatment Pain Level 3  -AK      Post-Treatment Pain Level 2  -AK      Subjective Pain Comment back  -AK      Aquatics    Aquatics performed? No  -AK      Exercise 1    Exercise Name 1 Rhythmic trunk Rotations in supine with deep breath at endrange x10 each  -AK        User Key  (r) = Recorded By, (t) = Taken By, (c) = Cosigned By    Initials Name Provider Type    POLLO Solares, PT Physical Therapist           Manual Rx (last 36 hours)      Manual Treatments       10/02/17 1100          Manual Rx 1    Manual Rx 1 Location P to A Thoracic Spine Mobilizations Grade 5  -AK      Manual Rx 2    Manual Rx 2 Location Manual stretch to b Quadriceps and iliopsoas  -AK      Manual Rx 3    Manual Rx 3 Location Cupping to Lumbar  Spine.  -AK        User Key  (r) = Recorded By, (t) = Taken By, (c) = Cosigned By    Initials Name Provider Type    POLLO Solares PT Physical Therapist                            Outcome Measures       10/02/17 1100          Modified Oswestry    Modified Oswestry Score/Comments 14/50=28%  -AK      Functional Assessment    Outcome Measure Options Modifed Owestry  -AK        User Key  (r) = Recorded By, (t) = Taken By, (c) = Cosigned By    Initials Name Provider Type    POLLO Solares PT Physical Therapist            Time Calculation:   Start Time: 1103  Stop Time: 1145  Time Calculation (min): 42 min  Total Timed Code Minutes- PT: 32 minute(s)     Therapy Charges for Today     Code Description Service Date Service Provider Modifiers Qty    12055213763 HC PT MOBILITY CURRENT 10/2/2017 Ciro Solares, PT GP, CJ 1    35957436424 HC PT MOBILITY PROJECTED 10/2/2017 Ciro Solares, PT GP, CI 1    80874155021 HC PT THER SUPP EA 15 MIN 10/2/2017 Ciro Solares, PT GP 1    40090812246 HC PT EVAL LOW COMPLEXITY 1 10/2/2017 Ciro Solares, PT GP 1    17024224593 HC PT MANUAL THERAPY EA 15 MIN 10/2/2017 Ciro Solares, PT GP 2          PT G-Codes  PT Professional Judgement Used?: Yes  Outcome Measure Options: Modifed Owestry  Score: 28%  Functional Limitation: Mobility: Walking and moving around  Mobility: Walking and Moving Around Current Status (): At least 20 percent but less than 40 percent impaired, limited or restricted  Mobility: Walking and Moving Around Goal Status (): At least 1 percent but less than 20 percent impaired, limited or restricted         Ciro Solares PT  10/2/2017

## 2017-10-04 ENCOUNTER — HOSPITAL ENCOUNTER (OUTPATIENT)
Dept: PHYSICAL THERAPY | Facility: HOSPITAL | Age: 67
Setting detail: THERAPIES SERIES
Discharge: HOME | End: 2017-10-04

## 2017-10-04 DIAGNOSIS — M48.50XD WEDGING OF VERTEBRA WITH ROUTINE HEALING, SUBSEQUENT ENCOUNTER: Primary | ICD-10-CM

## 2017-10-04 PROCEDURE — 97110 THERAPEUTIC EXERCISES: CPT

## 2017-10-04 PROCEDURE — 97140 MANUAL THERAPY 1/> REGIONS: CPT

## 2017-10-04 NOTE — THERAPY TREATMENT NOTE
Outpatient Physical Therapy Ortho Treatment Note  [facility]     Patient Name: Karey Cazares  : 1950  MRN: [id number]  Today's Date: 10/4/2017      Visit Date: 10/04/2017  Subjective Improvement: 0%  Visit Number: 2  Insurance approval: Medicare  MD Visit: PRN  Recert Date: 10/23/2017      Therapy Diagnosis:  Chronic LBP secondary to L1 Compression Fx with Non-Surgical Management (2017)  Visit Dx:    ICD-10-CM ICD-9-CM   1. Wedging of vertebra with routine healing, subsequent encounter M48.50XD [id number]       Patient Active Problem List   Diagnosis   • Acute low back pain   • Vertebral compression fracture   • Mixed hyperlipidemia        Past Medical History:   Diagnosis Date   • Acute allergic reaction    • Acute otitis externa    • Acute sinusitis    • Acute urinary tract infection    • Allergic conjunctivitis    • Closed fracture of foot    • Cough    • Encounter for immunization    • Exanthematous disorder    • H/O screening mammography 2015   • Hyperlipidemia    • Kellogg's metatarsalgia    • Pain in lower limb    • Pain in throat    • Screening for osteoporosis 2015   • Upper respiratory infection    • Urinary tract infectious disease    • Viral disease    • Vitamin D deficiency         Past Surgical History:   Procedure Laterality Date   • INJECTION OF MEDICATION  2016    Dexamethasone   • INJECTION OF MEDICATION  2016    INJECTION FOR NEUROMA   • INJECTION OF MEDICATION      kenalog x2- preformed on 2016 and 2011             PT Ortho       10/04/17 1345    Precautions and Contraindications    Precautions/Limitations no known precautions/limitations  -AK    Subjective Pain    Able to rate subjective pain? yes  -AK    Pre-Treatment Pain Level 5  -AK    Post-Treatment Pain Level 1  -AK    Subjective Pain Comment back  -AK      10/02/17 1100    Precautions and Contraindications    Precautions/Limitations no known precautions/limitations  -AK     Subjective Pain    Able to rate subjective pain? yes  -AK    Pre-Treatment Pain Level 3  -AK    Post-Treatment Pain Level 2  -AK    Subjective Pain Comment back  -AK    Posture/Observations    Posture/Observations Comments Pt presents with exagerrated thoracic kyphosis, forawrd head position and protracted B shoulders. B Paraspinals and R Quadratus Lumborum are Hypertonic, significant myofscial restriction and Trigger Points palpated throughtout thoraco-lumbar spinal musculature. Ely's Test: L=34 degrees, R=36 degrees. B Iliopsoas muscles tight and painful to testing.  -AK    ROM (Range of Motion)    General ROM Detail Lumbar: Flexion=65 degrees, extension=10 degrees, L Sidebending=14 degrees, R Sidebending=16 degrees, L Rotation=25 degress, R Rotation=54 degrees.  -AK    MMT (Manual Muscle Testing)    General MMT Assessment Detail L LE: Hip Flexion=4=/5, Hip Extension=3/5, Hip Abduction=4+/5, Knee Flexion+Extension=4+/5, Ankle DF+PF=5/5. R LE: Hip Flexion=4/5, Hip Extension=3/5, Hip Abduction=4+/5, Knee Flexion=4/5, Knee extension=4+/5, Ankle DF+PF=5/5.  -AK      User Key  (r) = Recorded By, (t) = Taken By, (c) = Cosigned By    Initials Name Provider Type    POLLO Solares, PT Physical Therapist                            PT Assessment/Plan       10/04/17 0917       PT Assessment    Assessment Comments Pt responded well to manual treatment today with immedite drop in pain untensity and improved posture. Will continue with postural correction exercises to provide stability and attenuate pain symptoms.  -AK       User Key  (r) = Recorded By, (t) = Taken By, (c) = Cosigned By    Initials Name Provider Type    POLLO Solares PT Physical Therapist                Modalities       10/04/17 1345          Moist Heat    MH Applied Yes  -AK      Location Thoracolumbar spine during stretches  -AK      Rx Minutes 10 mins  -AK        User Key  (r) = Recorded By, (t) = Taken By, (c) = Cosigned By    Initials Name Provider  Type    POLLO Solares, PT Physical Therapist                Exercises       10/04/17 1345          Subjective Pain    Able to rate subjective pain? yes  -AK      Pre-Treatment Pain Level 5  -AK      Post-Treatment Pain Level 1  -AK      Subjective Pain Comment back  -AK      Aquatics    Aquatics performed? No  -AK      Exercise 1    Exercise Name 1 Bridges 3x10  -AK      Exercise 2    Exercise Name 2 Latissimus Pulldowns 4x10 40#  -AK      Exercise 3    Exercise Name 3 Row machine 4x10 30#  -AK      Exercise 4    Exercise Name 4 B D2 flexion with 3# dumbells 5x10  -AK        User Key  (r) = Recorded By, (t) = Taken By, (c) = Cosigned By    Initials Name Provider Type    POLLO Solares, PT Physical Therapist                        Manual Rx (last 36 hours)      Manual Treatments       10/04/17 1345          Manual Rx 1    Manual Rx 1 Location P to A Thoracic Spine Mobilizations Grade 5  -AK      Manual Rx 2    Manual Rx 2 Location Manual stretch to b Quadriceps and Iliopsoas  -AK      Manual Rx 3    Manual Rx 3 Location Cupping to Lumbar Spine.  -AK        User Key  (r) = Recorded By, (t) = Taken By, (c) = Cosigned By    Initials Name Provider Type    POLLO Solares, PT Physical Therapist                PT OP Goals       10/04/17 1345       PT Short Term Goals    STG Date to Achieve 10/23/17  -AK     STG 1 Pt will have no greater than 3/10 pain in lower back at all times  -AK     STG 1 Progress Progressing  -AK     STG 2 Pt will have 5 degree or greater decrease in lumbar ROM deficits  -AK     STG 2 Progress Progressing  -AK     STG 3 Pt will be able to sleep for 4 hour intervals without interruption from pain symptoms.  -AK     STG 3 Progress Progressing  -AK     STG 4 Pt will have 4/5 or greater strength in B LEs in all regards  -AK     STG 4 Progress Progressing  -AK     Long Term Goals    LTG Date to Achieve 11/06/17  -AK     LTG 1 Pt will have no gretaer than 1/10 pain symptoms in lower back at all  times.  -AK     LTG 2 Pt will have lumbar ROM WFL  -AK     LTG 3 Pt will be able to sleep for 6 or more hour intervals without interruption from pain symptoms.  -AK     LTG 4 Pt will have 4+/5 strength in B LEs in all regards  -AK     LTG 5 Pt will have 10 point or greater improvement in Modified Oswestry score.  -AK       User Key  (r) = Recorded By, (t) = Taken By, (c) = Cosigned By    Initials Name Provider Type    POLLO Solares PT Physical Therapist                    Outcome Measures       10/02/17 1100          Modified Oswestry    Modified Oswestry Score/Comments 14/50=28%  -AK      Functional Assessment    Outcome Measure Options Modifed Owestry  -AK        User Key  (r) = Recorded By, (t) = Taken By, (c) = Cosigned By    Initials Name Provider Type    POLLO Solares PT Physical Therapist            Time Calculation:   Start Time: 1345  Stop Time: 1430  Time Calculation (min): 45 min  Total Timed Code Minutes- PT: 45 minute(s)    Therapy Charges for Today     Code Description Service Date Service Provider Modifiers Qty    76720078354 HC PT THER SUPP EA 15 MIN 10/4/2017 Ciro Solares, PT GP 1    21822422531 HC PT MANUAL THERAPY EA 15 MIN 10/4/2017 Ciro Solares PT GP 2    12478853372 HC PT THER PROC EA 15 MIN 10/4/2017 Ciro Solares PT GP 1                    Ciro Solares PT  10/4/2017

## 2017-10-10 ENCOUNTER — HOSPITAL ENCOUNTER (OUTPATIENT)
Dept: PHYSICAL THERAPY | Facility: HOSPITAL | Age: 67
Setting detail: THERAPIES SERIES
Discharge: HOME | End: 2017-10-10

## 2017-10-10 DIAGNOSIS — M48.50XD WEDGING OF VERTEBRA WITH ROUTINE HEALING, SUBSEQUENT ENCOUNTER: Primary | ICD-10-CM

## 2017-10-10 PROCEDURE — 97110 THERAPEUTIC EXERCISES: CPT

## 2017-10-10 PROCEDURE — 97140 MANUAL THERAPY 1/> REGIONS: CPT

## 2017-10-10 NOTE — THERAPY TREATMENT NOTE
Outpatient Physical Therapy Ortho Treatment Note  [facility]     Patient Name: Karey Cazares  : 1950  MRN: [id number]  Today's Date: 10/10/2017      Visit Date: 10/10/2017  Subjective Improvement: 0%  Visit Number: 3  Insurance approval: Medicare  MD Visit: PRN  Recert Date: 10/23/2017      Therapy Diagnosis: Chronic LBP secondary to L1 Compression Fx with Non-Surgical Management (2017)  Visit Dx:    ICD-10-CM ICD-9-CM   1. Wedging of vertebra with routine healing, subsequent encounter M48.50XD [id number]       Patient Active Problem List   Diagnosis   • Acute low back pain   • Vertebral compression fracture   • Mixed hyperlipidemia        Past Medical History:   Diagnosis Date   • Acute allergic reaction    • Acute otitis externa    • Acute sinusitis    • Acute urinary tract infection    • Allergic conjunctivitis    • Closed fracture of foot    • Cough    • Encounter for immunization    • Exanthematous disorder    • H/O screening mammography 2015   • Hyperlipidemia    • Kellogg's metatarsalgia    • Pain in lower limb    • Pain in throat    • Screening for osteoporosis 2015   • Upper respiratory infection    • Urinary tract infectious disease    • Viral disease    • Vitamin D deficiency         Past Surgical History:   Procedure Laterality Date   • INJECTION OF MEDICATION  2016    Dexamethasone   • INJECTION OF MEDICATION  2016    INJECTION FOR NEUROMA   • INJECTION OF MEDICATION      kenalog x2- preformed on 2016 and 2011             PT Ortho       10/10/17 1700    Precautions and Contraindications    Precautions/Limitations no known precautions/limitations  -AK    Subjective Pain    Able to rate subjective pain? yes  -AK    Pre-Treatment Pain Level 2  -AK    Post-Treatment Pain Level 1  -AK    Subjective Pain Comment back  -AK      10/10/17 1430    Precautions and Contraindications    Precautions/Limitations no known precautions/limitations  -AK     Subjective Pain    Able to rate subjective pain? yes  -AK    Pre-Treatment Pain Level 2  -AK    Post-Treatment Pain Level 1  -AK    Subjective Pain Comment back  -AK      User Key  (r) = Recorded By, (t) = Taken By, (c) = Cosigned By    Initials Name Provider Type    POLLO Solares, PT Physical Therapist                            PT Assessment/Plan       10/10/17 1703       PT Assessment    Assessment Comments Pt presents with much better controlled pain symptoms, improved posture and greater muscular endurance as compared to upon evaluation. Will progress volume and intensity of ther-ex as pt tolerates while still displaying proper muscular control and form.  -AK       User Key  (r) = Recorded By, (t) = Taken By, (c) = Cosigned By    Initials Name Provider Type    POLLO Solares, MIREILLE Physical Therapist                Modalities       10/10/17 1430          Moist Heat    MH Applied Yes  -AK      Location Thoracolumbar spine during stretches  -AK      Rx Minutes 10 mins  -AK        User Key  (r) = Recorded By, (t) = Taken By, (c) = Cosigned By    Initials Name Provider Type    POLLO Solares PT Physical Therapist                Exercises       10/10/17 1700 10/10/17 1430       Subjective Pain    Able to rate subjective pain? yes  -AK yes  -AK     Pre-Treatment Pain Level 2  -AK 2  -AK     Post-Treatment Pain Level 1  -AK 1  -AK     Subjective Pain Comment back  -AK back  -AK     Aquatics    Aquatics performed?  No  -AK     Exercise 1    Exercise Name 1  Bridges 4x10  -AK     Exercise 2    Exercise Name 2  Latissimus Pulldowns 4x10 40#  -AK     Exercise 3    Exercise Name 3  Row machine 4x10 30#  -AK     Exercise 4    Exercise Name 4  B D2 flexion with 3# dumbells 5x10  -AK       User Key  (r) = Recorded By, (t) = Taken By, (c) = Cosigned By    Initials Name Provider Type    POLLO Solares PT Physical Therapist                        Manual Rx (last 36 hours)      Manual Treatments       10/10/17 1430           Manual Rx 1    Manual Rx 1 Location P to A Thoracic Spine Mobilizations Grade 5  -AK      Manual Rx 2    Manual Rx 2 Location Manual stretch to b Quadriceps and Iliopsoas  -AK      Manual Rx 3    Manual Rx 3 Location Cupping to Lumbar Spine.  -AK        User Key  (r) = Recorded By, (t) = Taken By, (c) = Cosigned By    Initials Name Provider Type    POLLO Solares PT Physical Therapist                PT OP Goals       10/10/17 1430       PT Short Term Goals    STG Date to Achieve 10/23/17  -AK     STG 1 Pt will have no greater than 3/10 pain in lower back at all times  -AK     STG 1 Progress Progressing  -AK     STG 2 Pt will have 5 degree or greater decrease in lumbar ROM deficits  -AK     STG 2 Progress Progressing  -AK     STG 3 Pt will be able to sleep for 4 hour intervals without interruption from pain symptoms.  -AK     STG 3 Progress Progressing  -AK     STG 4 Pt will have 4/5 or greater strength in B LEs in all regards  -AK     STG 4 Progress Progressing  -AK     Long Term Goals    LTG Date to Achieve 11/06/17  -AK     LTG 1 Pt will have no gretaer than 1/10 pain symptoms in lower back at all times.  -AK     LTG 2 Pt will have lumbar ROM WFL  -AK     LTG 3 Pt will be able to sleep for 6 or more hour intervals without interruption from pain symptoms.  -AK     LTG 4 Pt will have 4+/5 strength in B LEs in all regards  -AK     LTG 5 Pt will have 10 point or greater improvement in Modified Oswestry score.  -AK       User Key  (r) = Recorded By, (t) = Taken By, (c) = Cosigned By    Initials Name Provider Type    POLLO Solares PT Physical Therapist                    Time Calculation:   Start Time: 1430  Stop Time: 1515  Time Calculation (min): 45 min  Total Timed Code Minutes- PT: 45 minute(s)    Therapy Charges for Today     Code Description Service Date Service Provider Modifiers Qty    87551493934 HC PT THER SUPP EA 15 MIN 10/10/2017 Ciro Solares, PT GP 1    41148496698 HC PT MANUAL THERAPY EA  15 MIN 10/10/2017 Ciro Solares, PT GP 2    [id number]  PT THER PROC EA 15 MIN 10/10/2017 Ciro Solares, PT GP 1                    Ciro Solares, PT  10/10/2017

## 2017-10-12 ENCOUNTER — APPOINTMENT (OUTPATIENT)
Dept: PHYSICAL THERAPY | Facility: HOSPITAL | Age: 67
End: 2017-10-12

## 2017-10-17 ENCOUNTER — HOSPITAL ENCOUNTER (OUTPATIENT)
Dept: PHYSICAL THERAPY | Facility: HOSPITAL | Age: 67
Setting detail: THERAPIES SERIES
Discharge: HOME | End: 2017-10-17

## 2017-10-17 DIAGNOSIS — M48.50XD WEDGING OF VERTEBRA WITH ROUTINE HEALING, SUBSEQUENT ENCOUNTER: Primary | ICD-10-CM

## 2017-10-17 PROCEDURE — 97110 THERAPEUTIC EXERCISES: CPT

## 2017-10-17 NOTE — THERAPY TREATMENT NOTE
Outpatient Physical Therapy Ortho Treatment Note  [facility]     Patient Name: Karey Cazares  : 1950  MRN: [id number]  Today's Date: 10/17/2017      Visit Date: 10/17/2017    Subjective Improvement:    Visits Attended: 4/4   Visits Approved medicare   MD visit: PRN   Recert Date: 10/23/2017       Visit Dx:    ICD-10-CM ICD-9-CM   1. Wedging of vertebra with routine healing, subsequent encounter M48.50XD [id number]       Patient Active Problem List   Diagnosis   • Acute low back pain   • Vertebral compression fracture   • Mixed hyperlipidemia        Past Medical History:   Diagnosis Date   • Acute allergic reaction    • Acute otitis externa    • Acute sinusitis    • Acute urinary tract infection    • Allergic conjunctivitis    • Closed fracture of foot    • Cough    • Encounter for immunization    • Exanthematous disorder    • H/O screening mammography 2015   • Hyperlipidemia    • Kellogg's metatarsalgia    • Pain in lower limb    • Pain in throat    • Screening for osteoporosis 2015   • Upper respiratory infection    • Urinary tract infectious disease    • Viral disease    • Vitamin D deficiency         Past Surgical History:   Procedure Laterality Date   • INJECTION OF MEDICATION  2016    Dexamethasone   • INJECTION OF MEDICATION  2016    INJECTION FOR NEUROMA   • INJECTION OF MEDICATION      kenalog x2- preformed on 2016 and 2011                             PT Assessment/Plan       10/17/17 1300       PT Assessment    Assessment Comments Pt tolerated new ex's well and reports her pain is much easier to control.  -TW     PT Plan    PT Frequency 2x/week  -TW     Predicted Duration of Therapy Intervention (days/wks) 5 weeks  -TW     PT Plan Comments Cont with progression and monitor pt's tolerance.  -TW       User Key  (r) = Recorded By, (t) = Taken By, (c) = Cosigned By    Initials Name Provider Type    CHANDA Dawn PTA Physical Therapy Assistant     "                Exercises       10/17/17 1300          Subjective Comments    Subjective Comments Pt reports \"Im doing so much better.\"   -TW      Subjective Pain    Able to rate subjective pain? yes  -TW      Pre-Treatment Pain Level 2  -TW      Post-Treatment Pain Level 0  -TW      Exercise 1    Exercise Name 1 Pro II, seat at 11, level2  -TW      Time (Minutes) 1 10  -TW      Exercise 2    Exercise Name 2 Incline stretch  -TW      Sets 2 3  -TW      Time (Seconds) 2 30  -TW      Exercise 3    Exercise Name 3 standing HS stretch  -TW      Sets 3 3  -TW      Time (Seconds) 3 30  -TW      Exercise 4    Exercise Name 4 Cybex row  -TW      Sets 4 4  -TW      Reps 4 10  -TW      Additional Comments 40#  -TW      Exercise 5    Exercise Name 5 Cybex lat pull down  -TW      Sets 5 4  -TW      Reps 5 10  -TW      Additional Comments 50#  -TW      Exercise 6    Exercise Name 6 Cybex chest press  -TW      Sets 6 4  -TW      Reps 6 10  -TW      Additional Comments 30#  -TW        User Key  (r) = Recorded By, (t) = Taken By, (c) = Cosigned By    Initials Name Provider Type    TW Girma Dawn, PTA Physical Therapy Assistant                               PT OP Goals       10/17/17 1358 10/17/17 1300    PT Short Term Goals    STG Date to Achieve  10/23/17  -TW    STG 1  Pt will have no greater than 3/10 pain in lower back at all times  -TW    STG 1 Progress  Progressing  -TW    STG 2  Pt will have 5 degree or greater decrease in lumbar ROM deficits  -TW    STG 2 Progress  Progressing  -TW    STG 3  Pt will be able to sleep for 4 hour intervals without interruption from pain symptoms.  -TW    STG 3 Progress  Progressing  -TW    STG 4  Pt will have 4/5 or greater strength in B LEs in all regards  -TW    STG 4 Progress  Progressing  -TW    Long Term Goals    LTG Date to Achieve  11/06/17  -TW    LTG 1  Pt will have no gretaer than 1/10 pain symptoms in lower back at all times.  -TW    LTG 2  Pt will have lumbar ROM WFL  -TW    " LTG 3  Pt will be able to sleep for 6 or more hour intervals without interruption from pain symptoms.  -TW    LTG 4  Pt will have 4+/5 strength in B LEs in all regards  -TW    LTG 5  Pt will have 10 point or greater improvement in Modified Oswestry score.  -TW    Time Calculation    PT Goal Re-Cert Due Date 10/23/17  -TW       User Key  (r) = Recorded By, (t) = Taken By, (c) = Cosigned By    Initials Name Provider Type    CHANDA Dawn PTA Physical Therapy Assistant                Therapy Education       10/17/17 1300          Therapy Education    Given HEP  -TW      Program Reinforced  -TW      How Provided Verbal  -TW      Provided to Patient  -TW      Level of Understanding Verbalized;Demonstrated  -TW        User Key  (r) = Recorded By, (t) = Taken By, (c) = Cosigned By    Initials Name Provider Type    CHANDA Dawn PTA Physical Therapy Assistant                Time Calculation:   Start Time: 1358  Stop Time: 1448  Time Calculation (min): 50 min  Total Timed Code Minutes- PT: 50 minute(s)    Therapy Charges for Today     Code Description Service Date Service Provider Modifiers Qty    48525999032 HC PT THER PROC EA 15 MIN 10/17/2017 Girma Dawn PTA GP 3                    Girma Dawn PTA  10/17/2017

## 2017-10-19 ENCOUNTER — HOSPITAL ENCOUNTER (OUTPATIENT)
Dept: PHYSICAL THERAPY | Facility: HOSPITAL | Age: 67
Setting detail: THERAPIES SERIES
Discharge: HOME | End: 2017-10-19

## 2017-10-19 DIAGNOSIS — M48.50XD WEDGING OF VERTEBRA WITH ROUTINE HEALING, SUBSEQUENT ENCOUNTER: Primary | ICD-10-CM

## 2017-10-19 PROCEDURE — G0283 ELEC STIM OTHER THAN WOUND: HCPCS

## 2017-10-19 PROCEDURE — 97110 THERAPEUTIC EXERCISES: CPT

## 2017-10-19 NOTE — THERAPY TREATMENT NOTE
Outpatient Physical Therapy Ortho Treatment Note  [facility]     Patient Name: Karey Cazares  : 1950  MRN: [id number]  Today's Date: 10/19/2017      Visit Date: 10/19/2017    Subjective Improvement 0  Visits 5/6  Visits approved medicre cap  RTMD PRN  Recert Date 10-    Chronic low back pain secondary to L1 compression Fx with non surgical management    Visit Dx:    ICD-10-CM ICD-9-CM   1. Wedging of vertebra with routine healing, subsequent encounter M48.50XD [id number]       Patient Active Problem List   Diagnosis   • Acute low back pain   • Vertebral compression fracture   • Mixed hyperlipidemia        Past Medical History:   Diagnosis Date   • Acute allergic reaction    • Acute otitis externa    • Acute sinusitis    • Acute urinary tract infection    • Allergic conjunctivitis    • Closed fracture of foot    • Cough    • Encounter for immunization    • Exanthematous disorder    • H/O screening mammography 2015   • Hyperlipidemia    • Kellogg's metatarsalgia    • Pain in lower limb    • Pain in throat    • Screening for osteoporosis 2015   • Upper respiratory infection    • Urinary tract infectious disease    • Viral disease    • Vitamin D deficiency         Past Surgical History:   Procedure Laterality Date   • INJECTION OF MEDICATION  2016    Dexamethasone   • INJECTION OF MEDICATION  2016    INJECTION FOR NEUROMA   • INJECTION OF MEDICATION      kenalog x2- preformed on 2016 and 2011                             PT Assessment/Plan       10/19/17 1531       PT Assessment    Assessment Comments Patients treatment this date consisted mainly of stretching an dpeain relieving modalities sedcondary to reports of inceaase pain  -CP     PT Plan    PT Frequency 2x/week  -CP     Predicted Duration of Therapy Intervention (days/wks) 4 weeks  -CP     PT Plan Comments Cont with POC  -CP       User Key  (r) = Recorded By, (t) = Taken By, (c) = Cosigned By     Initials Name Provider Type    CP Divya Guthrie PTA Physical Therapy Assistant                Modalities       10/19/17 1300          Moist Heat    MH Applied Yes  -CP      Location low back in sitting  -CP      Rx Minutes --   20 minutes withifc  -CP      MH S/P Rx Yes  -CP      ELECTRICAL STIMULATION    Attended/Unattended Unattended  -CP      Stimulation Type IFC  -CP      Location/Electrode Placement/Other low to mid back  -CP      Rx Minutes 20 mins  -CP        User Key  (r) = Recorded By, (t) = Taken By, (c) = Cosigned By    Initials Name Provider Type    CP Divya Guthrie PTA Physical Therapy Assistant                Exercises       10/19/17 1300          Subjective Comments    Subjective Comments Patient reports increase mid back pain today.  -CP      Subjective Pain    Able to rate subjective pain? yes  -CP      Pre-Treatment Pain Level 3  -CP      Post-Treatment Pain Level 0  -CP      Aquatics    Aquatics performed? No  -CP      Exercise 1    Exercise Name 1 Pro II level 2  -CP      Time (Minutes) 1 10  -CP      Exercise 2    Exercise Name 2 Standing HS Stretch bilateral  -CP      Sets 2 3  -CP      Time (Seconds) 2 30  -CP      Exercise 3    Exercise Name 3 incline Stretch  -CP      Sets 3 3  -CP      Time (Seconds) 3 30  -CP      Exercise 4    Exercise Name 4 Seated mid back stretch with swiss ball  -CP      Sets 4 1  -CP      Reps 4 10  -CP      Time (Seconds) 4 10  -CP      Exercise 5    Exercise Name 5 LTR stretch with over pressure  -CP      Reps 5 20  -CP      Exercise 6    Exercise Name 6 DKTC with Swiss ball  -CP      Reps 6 15  -CP      Time (Seconds) 6 10  -CP      Exercise 7    Exercise Name 7 bridging  -CP      Sets 7 2  -CP      Reps 7 10  -CP        User Key  (r) = Recorded By, (t) = Taken By, (c) = Cosigned By    Initials Name Provider Type    CP Divya Guthrie PTA Physical Therapy Assistant                               PT OP Goals       10/19/17 1500       PT Short Term Goals     STG Date to Achieve 10/23/17  -CP     STG 1 Pt will have no greater than 3/10 pain in lower back at all times  -CP     STG 1 Progress Progressing  -CP     STG 2 Pt will have 5 degree or greater decrease in lumbar ROM deficits  -CP     STG 2 Progress Progressing  -CP     STG 3 Pt will be able to sleep for 4 hour intervals without interruption from pain symptoms.  -CP     STG 3 Progress Progressing  -CP     STG 4 Pt will have 4/5 or greater strength in B LEs in all regards  -CP     STG 4 Progress Progressing  -CP     Long Term Goals    LTG Date to Achieve 11/06/17  -CP     LTG 1 Pt will have no gretaer than 1/10 pain symptoms in lower back at all times.  -CP     LTG 1 Progress Progressing  -CP     LTG 2 Pt will have lumbar ROM WFL  -CP     LTG 2 Progress Not Met  -CP     LTG 3 Pt will be able to sleep for 6 or more hour intervals without interruption from pain symptoms.  -CP     LTG 3 Progress Progressing  -CP     LTG 4 Pt will have 4+/5 strength in B LEs in all regards  -CP     LTG 4 Progress Progressing  -CP     LTG 5 Pt will have 10 point or greater improvement in Modified Oswestry score.  -CP     LTG 5 Progress Progressing  -CP     Time Calculation    PT Goal Re-Cert Due Date 10/23/17  -CP       User Key  (r) = Recorded By, (t) = Taken By, (c) = Cosigned By    Initials Name Provider Type    CP Divya Guthrie PTA Physical Therapy Assistant                Therapy Education       10/19/17 3058          Therapy Education    Education Details TR stretch  -CP      Given HEP  -CP      Program New  -CP      How Provided Verbal;Demonstration  -CP      Level of Understanding Verbalized;Demonstrated  -CP        User Key  (r) = Recorded By, (t) = Taken By, (c) = Cosigned By    Initials Name Provider Type    CP Divya Guthrie PTA Physical Therapy Assistant                Time Calculation:   Start Time: 1300  Stop Time: 1405  Time Calculation (min): 65 min  Total Timed Code Minutes- PT: 65 minute(s)    Therapy Charges for  Today     Code Description Service Date Service Provider Modifiers Qty    77578453752 HC PT THER PROC EA 15 MIN 10/19/2017 Divya Guthrie PTA GP 3    92359449904 HC PT ELECTRICAL STIM UNATTENDED 10/19/2017 Divya Guthrie PTA  1    38571407059 HC PT THER SUPP EA 15 MIN 10/19/2017 Divya Guthrie PTA GP 1     MA TENS SUPPL 2 LEAD PER MONTH 10/19/2017 Divya Guthrie PTA  1                    Divya Guthrie PTA  10/19/2017

## 2017-10-23 ENCOUNTER — HOSPITAL ENCOUNTER (OUTPATIENT)
Dept: PHYSICAL THERAPY | Facility: HOSPITAL | Age: 67
Setting detail: THERAPIES SERIES
Discharge: HOME | End: 2017-10-23

## 2017-10-23 DIAGNOSIS — M48.50XD WEDGING OF VERTEBRA WITH ROUTINE HEALING, SUBSEQUENT ENCOUNTER: Primary | ICD-10-CM

## 2017-10-23 PROCEDURE — 97110 THERAPEUTIC EXERCISES: CPT

## 2017-10-23 PROCEDURE — 97140 MANUAL THERAPY 1/> REGIONS: CPT

## 2017-10-23 NOTE — THERAPY TREATMENT NOTE
Outpatient Physical Therapy Ortho Treatment Note  [facility]     Patient Name: Karey Cazares  : 1950  MRN: [id number]  Today's Date: 10/23/2017      Visit Date: 10/23/2017  Subjective Improvement: 20%  Visit Number: 6  Insurance approval: Medicare Cap  MD Visit: PRN  Recert Date: 10/23/2017      Therapy Diagnosis: Chronic low back pain secondary to L1 compression Fx with non surgical management  Visit Dx:    ICD-10-CM ICD-9-CM   1. Wedging of vertebra with routine healing, subsequent encounter M48.50XD [id number]       Patient Active Problem List   Diagnosis   • Acute low back pain   • Vertebral compression fracture   • Mixed hyperlipidemia        Past Medical History:   Diagnosis Date   • Acute allergic reaction    • Acute otitis externa    • Acute sinusitis    • Acute urinary tract infection    • Allergic conjunctivitis    • Closed fracture of foot    • Cough    • Encounter for immunization    • Exanthematous disorder    • H/O screening mammography 2015   • Hyperlipidemia    • Kellogg's metatarsalgia    • Pain in lower limb    • Pain in throat    • Screening for osteoporosis 2015   • Upper respiratory infection    • Urinary tract infectious disease    • Viral disease    • Vitamin D deficiency         Past Surgical History:   Procedure Laterality Date   • INJECTION OF MEDICATION  2016    Dexamethasone   • INJECTION OF MEDICATION  2016    INJECTION FOR NEUROMA   • INJECTION OF MEDICATION      kenalog x2- preformed on 2016 and 2011             PT Ortho       10/23/17 1430    Subjective Pain    Able to rate subjective pain? yes  -AK    Pre-Treatment Pain Level 3  -AK    Post-Treatment Pain Level 1  -AK      User Key  (r) = Recorded By, (t) = Taken By, (c) = Cosigned By    Initials Name Provider Type    POLLO Solares, PT Physical Therapist                            PT Assessment/Plan       10/23/17 1613       PT Assessment    Assessment Comments PT added sit to  "stands to ther ex program today in an effort to strengthen Posterior Chain and decrease pain symptoms throughout lower back. Will continue to progress volume and intensity of ther-ex as pt can tolerate without exacerbation of symptoms.  -AK       User Key  (r) = Recorded By, (t) = Taken By, (c) = Cosigned By    Initials Name Provider Type    POLLO Solares PT Physical Therapist                Modalities       10/23/17 1430          Moist Heat    MH Applied Yes  -AK      Location lower back in prone  -AK      Rx Minutes 10 mins  -AK      MH Prior to Rx Yes  -AK        User Key  (r) = Recorded By, (t) = Taken By, (c) = Cosigned By    Initials Name Provider Type    POLLO Solares PT Physical Therapist                Exercises       10/23/17 1430          Subjective Pain    Able to rate subjective pain? yes  -AK      Pre-Treatment Pain Level 3  -AK      Post-Treatment Pain Level 1  -AK      Aquatics    Aquatics performed? No  -AK      Exercise 1    Exercise Name 1 Pec stretch over bolsetr in supine 4x30 second hold  -AK      Exercise 2    Exercise Name 2 Latissimus Pulldowns 4x10 40#  -AK      Exercise 3    Exercise Name 3 Row machine 4x10 30#  -AK      Exercise 4    Exercise Name 4 B D2 flexion with 3# dumbells 5x10  -AK      Exercise 5    Exercise Name 5 sit to stands from 18\" tall platform 2x10  -AK        User Key  (r) = Recorded By, (t) = Taken By, (c) = Cosigned By    Initials Name Provider Type    POLLO Solares PT Physical Therapist                        Manual Rx (last 36 hours)      Manual Treatments       10/23/17 1430          Manual Rx 1    Manual Rx 1 Location P to A Thoracic Spine Mobilizations Grade 5  -AK      Manual Rx 2    Manual Rx 2 Location Manual stretch to B Quadriceps and Iliopsoas  -AK      Manual Rx 3    Manual Rx 3 Location Cupping to Lumbar Spine.  -AK        User Key  (r) = Recorded By, (t) = Taken By, (c) = Cosigned By    Initials Name Provider Type    POLLO Solares PT " Physical Therapist                PT OP Goals       10/23/17 1430       PT Short Term Goals    STG Date to Achieve 10/23/17  -AK     STG 1 Pt will have no greater than 3/10 pain in lower back at all times  -AK     STG 1 Progress Met  -AK     STG 2 Pt will have 5 degree or greater decrease in lumbar ROM deficits  -AK     STG 2 Progress Progressing  -AK     STG 3 Pt will be able to sleep for 4 hour intervals without interruption from pain symptoms.  -AK     STG 3 Progress Met  -AK     STG 4 Pt will have 4/5 or greater strength in B LEs in all regards  -AK     STG 4 Progress Progressing  -AK     Long Term Goals    LTG Date to Achieve 11/06/17  -AK     LTG 1 Pt will have no gretaer than 1/10 pain symptoms in lower back at all times.  -AK     LTG 1 Progress Progressing  -AK     LTG 2 Pt will have lumbar ROM WFL  -AK     LTG 2 Progress Not Met  -AK     LTG 3 Pt will be able to sleep for 6 or more hour intervals without interruption from pain symptoms.  -AK     LTG 3 Progress Progressing  -AK     LTG 4 Pt will have 4+/5 strength in B LEs in all regards  -AK     LTG 4 Progress Progressing  -AK     LTG 5 Pt will have 10 point or greater improvement in Modified Oswestry score.  -AK     LTG 5 Progress Progressing  -AK       User Key  (r) = Recorded By, (t) = Taken By, (c) = Cosigned By    Initials Name Provider Type    POLLO Solares PT Physical Therapist                    Time Calculation:   Start Time: 1430  Stop Time: 1515  Time Calculation (min): 45 min  Total Timed Code Minutes- PT: 45 minute(s)    Therapy Charges for Today     Code Description Service Date Service Provider Modifiers Qty    31935410010 HC PT THER SUPP EA 15 MIN 10/23/2017 Ciro Solares, PT GP 1    86434044477 HC PT MANUAL THERAPY EA 15 MIN 10/23/2017 Ciro Solares PT GP 1    27303086556 HC PT THER PROC EA 15 MIN 10/23/2017 Ciro Solares PT GP 2                    Ciro Solares PT  10/23/2017

## 2017-10-26 ENCOUNTER — APPOINTMENT (OUTPATIENT)
Dept: PHYSICAL THERAPY | Facility: HOSPITAL | Age: 67
End: 2017-10-26

## 2018-05-02 ENCOUNTER — OFFICE VISIT (OUTPATIENT)
Dept: PODIATRY | Facility: CLINIC | Age: 68
End: 2018-05-02

## 2018-05-02 VITALS
OXYGEN SATURATION: 94 % | HEART RATE: 107 BPM | SYSTOLIC BLOOD PRESSURE: 138 MMHG | DIASTOLIC BLOOD PRESSURE: 83 MMHG | HEIGHT: 66 IN | WEIGHT: 177 LBS | BODY MASS INDEX: 28.45 KG/M2

## 2018-05-02 DIAGNOSIS — M19.171 POST-TRAUMATIC OSTEOARTHRITIS OF RIGHT FOOT: ICD-10-CM

## 2018-05-02 DIAGNOSIS — M79.671 RIGHT FOOT PAIN: Primary | ICD-10-CM

## 2018-05-02 PROCEDURE — 99203 OFFICE O/P NEW LOW 30 MIN: CPT | Performed by: PODIATRIST

## 2018-05-02 RX ORDER — METHYLPREDNISOLONE 4 MG/1
TABLET ORAL
Qty: 21 TABLET | Refills: 0 | Status: SHIPPED | OUTPATIENT
Start: 2018-05-02 | End: 2018-09-07

## 2018-05-02 RX ORDER — METHYLPREDNISOLONE 4 MG/1
TABLET ORAL
Qty: 21 TABLET | Refills: 0 | Status: SHIPPED | OUTPATIENT
Start: 2018-05-02 | End: 2018-05-02 | Stop reason: SDUPTHER

## 2018-05-02 NOTE — PROGRESS NOTES
Karey Cazares  1950  68 y.o. female  Patient presents today for right foot pain.  05/02/2018    Chief Complaint   Patient presents with   • Right Foot - Pain       History of Present Illness    Karey Cazares is a 68 y.o.female who presents to clinic with chief complaint of right foot pain.  Pain is located to the outside of the foot.  Pain has been present for several years.  She rates the pain as a 3 out of 10.  She describes it as dull and achy.  States that the fifth metatarsal bone has been broken before.  She denies any new injuries.  She has no other pedal complaints.      Past Medical History:   Diagnosis Date   • Acute allergic reaction    • Acute otitis externa    • Acute sinusitis    • Acute urinary tract infection    • Allergic conjunctivitis    • Closed fracture of foot    • Cough    • Encounter for immunization    • Exanthematous disorder    • H/O screening mammography 03/12/2015   • Hyperlipidemia    • Kellogg's metatarsalgia    • Pain in lower limb    • Pain in throat    • Screening for osteoporosis 03/12/2015   • Upper respiratory infection    • Urinary tract infectious disease    • Viral disease    • Vitamin D deficiency          Past Surgical History:   Procedure Laterality Date   • CATARACT EXTRACTION     • INJECTION OF MEDICATION  01/17/2016    Dexamethasone   • INJECTION OF MEDICATION  01/17/2016    INJECTION FOR NEUROMA   • INJECTION OF MEDICATION      kenalog x2- preformed on 01/17/2016 and 01/20/2011   • NOSE SURGERY  1997   • WRIST SURGERY Left          Family History   Problem Relation Age of Onset   • Cancer Other    • Diabetes Other    • Hyperlipidemia Other    • Stroke Other    • Vision loss Other    • Hyperlipidemia Mother    • Stroke Mother    • Cancer Father    • Diabetes Maternal Grandmother        Allergies   Allergen Reactions   • Erythromycin Base    • Keflex [Cephalexin] Rash   • Lortab [Hydrocodone-Acetaminophen] Rash   • Orudis [Ketoprofen] Rash   • Other Rash      "Phenobarbital and Darvocet-N 100   • Sulfa Antibiotics Rash       Social History     Social History   • Marital status:      Spouse name: N/A   • Number of children: N/A   • Years of education: N/A     Occupational History   • Not on file.     Social History Main Topics   • Smoking status: Never Smoker   • Smokeless tobacco: Never Used   • Alcohol use No   • Drug use: No   • Sexual activity: Defer     Other Topics Concern   • Not on file     Social History Narrative   • No narrative on file         Current Outpatient Prescriptions   Medication Sig Dispense Refill   • aspirin 325 MG tablet Take 650 mg by mouth Daily.     • azelastine (OPTIVAR) 0.05 % ophthalmic solution Administer 1 drop to both eyes 2 (two) times a day.     • B Complex-C (SUPER B COMPLEX PO) Take 1 tablet by mouth.     • Ergocalciferol 2000 UNITS tablet Take 1 tablet by mouth daily.     • IBUPROFEN IB PO Take  by mouth.     • Lecithin 1200 MG capsule Take  by mouth.     • pravastatin (PRAVACHOL) 20 MG tablet Take 1 tablet by mouth Every Night. 90 tablet 3   • Zinc Acetate (GALZIN) 25 MG capsule Take 1 capsule by mouth daily.     • MethylPREDNISolone (MEDROL, FLORENCE,) 4 MG tablet Take as directed 21 tablet 0     No current facility-administered medications for this visit.          OBJECTIVE    /83   Pulse 107   Ht 167.6 cm (66\")   Wt 80.3 kg (177 lb)   SpO2 94%   BMI 28.57 kg/m²       Review of Systems   Constitutional: Negative.    HENT: Negative.    Eyes: Negative.    Respiratory: Negative.    Cardiovascular: Negative.    Gastrointestinal: Negative.    Endocrine: Positive for heat intolerance.   Musculoskeletal: Positive for back pain.        Foot pain   Skin: Negative.    Neurological: Negative.    Hematological: Negative.            Physical Exam   Constitutional: She is oriented to person, place, and time. She appears well-developed and well-nourished. No distress.   HENT:   Head: Normocephalic and atraumatic.   Nose: Nose normal. "   Eyes: Conjunctivae and EOM are normal. Pupils are equal, round, and reactive to light.   Pulmonary/Chest: Effort normal. No respiratory distress. She has no wheezes.   Neurological: She is alert and oriented to person, place, and time. She has normal reflexes.   Psychiatric: She has a normal mood and affect. Her behavior is normal.   Vitals reviewed.      Gait: normal    Assistive Device: none    Right Lower Extremity    Cardiovascular:    DP/PT pulses palpable    Pedal hair growth present.   No erythema or edema noted     Musculoskeletal:  Muscle strength is 5/5 for all muscle groups tested   ROM of the 1st MTP is full without pain or crepitus  ROM of the MTJ is full without pain or crepitus    ROM of the STJ is full without pain or crepitus    ROM of the ankle joint is full without pain or crepitus    POP to the styloid process of the fifth metatarsal    Dermatological:   Webspaces 1-4 bilateral are clean, dry and intact.   No subcutaneous nodules or masses noted    No open wounds noted     Neurological:   Protective sensation intact    Sensation intact to light touch      Radiographs: 3 views the right foot were obtained today.  No acute osseous abnormalities are noted.        Procedures        ASSESSMENT AND PLAN    Karey was seen today for pain.    Diagnoses and all orders for this visit:    Right foot pain  -     XR Foot 3+ View Right    Post-traumatic osteoarthritis of right foot    Other orders  -     Discontinue: MethylPREDNISolone (MEDROL, FLORENCE,) 4 MG tablet; Take as directed  -     MethylPREDNISolone (MEDROL, FLORENCE,) 4 MG tablet; Take as directed      - Comprehensive foot and ankle exam performed.   - X-rays taken and reviewed  - Recommended more supportive shoe gear  - Rx for Medrol Dosepak  - All questions were answered to the patients satisfaction.  - RTC 4 weeks as needed          This document has been electronically signed by Maged Mirza DPM on May 3, 2018 9:30 AM     5/3/2018  9:30 AM

## 2018-08-29 ENCOUNTER — TELEPHONE (OUTPATIENT)
Dept: FAMILY MEDICINE CLINIC | Facility: CLINIC | Age: 68
End: 2018-08-29

## 2018-08-29 DIAGNOSIS — E78.2 MIXED HYPERLIPIDEMIA: Primary | Chronic | ICD-10-CM

## 2018-08-31 ENCOUNTER — LAB (OUTPATIENT)
Dept: LAB | Facility: HOSPITAL | Age: 68
End: 2018-08-31

## 2018-08-31 DIAGNOSIS — E78.2 MIXED HYPERLIPIDEMIA: ICD-10-CM

## 2018-08-31 LAB
ALBUMIN SERPL-MCNC: 4.4 G/DL (ref 3.4–4.8)
ALBUMIN/GLOB SERPL: 1.3 G/DL (ref 1.1–1.8)
ALP SERPL-CCNC: 66 U/L (ref 38–126)
ALT SERPL W P-5'-P-CCNC: 63 U/L (ref 9–52)
ANION GAP SERPL CALCULATED.3IONS-SCNC: 10 MMOL/L (ref 5–15)
ARTICHOKE IGE QN: 120 MG/DL (ref 1–129)
AST SERPL-CCNC: 62 U/L (ref 14–36)
BACTERIA UR QL AUTO: ABNORMAL /HPF
BILIRUB SERPL-MCNC: 0.6 MG/DL (ref 0.2–1.3)
BILIRUB UR QL STRIP: NEGATIVE
BUN BLD-MCNC: 18 MG/DL (ref 7–21)
BUN/CREAT SERPL: 18.6 (ref 7–25)
CALCIUM SPEC-SCNC: 9.7 MG/DL (ref 8.4–10.2)
CHLORIDE SERPL-SCNC: 103 MMOL/L (ref 95–110)
CHOLEST SERPL-MCNC: 217 MG/DL (ref 0–199)
CLARITY UR: CLEAR
CO2 SERPL-SCNC: 25 MMOL/L (ref 22–31)
COLOR UR: YELLOW
CREAT BLD-MCNC: 0.97 MG/DL (ref 0.5–1)
GFR SERPL CREATININE-BSD FRML MDRD: 57 ML/MIN/1.73 (ref 45–104)
GLOBULIN UR ELPH-MCNC: 3.5 GM/DL (ref 2.3–3.5)
GLUCOSE BLD-MCNC: 106 MG/DL (ref 60–100)
GLUCOSE UR STRIP-MCNC: NEGATIVE MG/DL
HDLC SERPL-MCNC: 56 MG/DL (ref 60–200)
HGB UR QL STRIP.AUTO: NEGATIVE
HYALINE CASTS UR QL AUTO: ABNORMAL /LPF
KETONES UR QL STRIP: NEGATIVE
LDLC/HDLC SERPL: 2.31 {RATIO} (ref 0–3.22)
LEUKOCYTE ESTERASE UR QL STRIP.AUTO: ABNORMAL
NITRITE UR QL STRIP: NEGATIVE
PH UR STRIP.AUTO: 6 [PH] (ref 5–9)
POTASSIUM BLD-SCNC: 4.4 MMOL/L (ref 3.5–5.1)
PROT SERPL-MCNC: 7.9 G/DL (ref 6.3–8.6)
PROT UR QL STRIP: NEGATIVE
RBC # UR: ABNORMAL /HPF
REF LAB TEST METHOD: ABNORMAL
SODIUM BLD-SCNC: 138 MMOL/L (ref 137–145)
SP GR UR STRIP: 1.01 (ref 1–1.03)
SQUAMOUS #/AREA URNS HPF: ABNORMAL /HPF
TRIGL SERPL-MCNC: 157 MG/DL (ref 20–199)
UROBILINOGEN UR QL STRIP: ABNORMAL
WBC UR QL AUTO: ABNORMAL /HPF

## 2018-08-31 PROCEDURE — 80061 LIPID PANEL: CPT

## 2018-08-31 PROCEDURE — 80053 COMPREHEN METABOLIC PANEL: CPT

## 2018-08-31 PROCEDURE — 81001 URINALYSIS AUTO W/SCOPE: CPT

## 2018-08-31 PROCEDURE — 36415 COLL VENOUS BLD VENIPUNCTURE: CPT

## 2018-09-07 ENCOUNTER — OFFICE VISIT (OUTPATIENT)
Dept: FAMILY MEDICINE CLINIC | Facility: CLINIC | Age: 68
End: 2018-09-07

## 2018-09-07 VITALS
OXYGEN SATURATION: 98 % | SYSTOLIC BLOOD PRESSURE: 108 MMHG | HEART RATE: 88 BPM | DIASTOLIC BLOOD PRESSURE: 70 MMHG | BODY MASS INDEX: 27.51 KG/M2 | HEIGHT: 66 IN | WEIGHT: 171.2 LBS

## 2018-09-07 DIAGNOSIS — Z12.31 ENCOUNTER FOR SCREENING MAMMOGRAM FOR BREAST CANCER: ICD-10-CM

## 2018-09-07 DIAGNOSIS — F32.A DEPRESSIVE DISORDER: Chronic | ICD-10-CM

## 2018-09-07 DIAGNOSIS — Z12.11 SCREENING FOR COLON CANCER: ICD-10-CM

## 2018-09-07 DIAGNOSIS — E78.2 MIXED HYPERLIPIDEMIA: Chronic | ICD-10-CM

## 2018-09-07 DIAGNOSIS — Z12.31 ENCOUNTER FOR SCREENING MAMMOGRAM FOR MALIGNANT NEOPLASM OF BREAST: Primary | ICD-10-CM

## 2018-09-07 DIAGNOSIS — Z00.00 MEDICARE ANNUAL WELLNESS VISIT, SUBSEQUENT: Primary | ICD-10-CM

## 2018-09-07 PROCEDURE — 99213 OFFICE O/P EST LOW 20 MIN: CPT | Performed by: GENERAL PRACTICE

## 2018-09-07 PROCEDURE — G0439 PPPS, SUBSEQ VISIT: HCPCS | Performed by: GENERAL PRACTICE

## 2018-09-07 RX ORDER — ASPIRIN 81 MG/1
81 TABLET, CHEWABLE ORAL DAILY
COMMUNITY

## 2018-09-07 RX ORDER — ESCITALOPRAM OXALATE 10 MG/1
10 TABLET ORAL DAILY
Qty: 30 TABLET | Refills: 1 | Status: SHIPPED | OUTPATIENT
Start: 2018-09-07 | End: 2018-10-19 | Stop reason: SDUPTHER

## 2018-09-07 RX ORDER — PRAVASTATIN SODIUM 20 MG
20 TABLET ORAL NIGHTLY
Qty: 90 TABLET | Refills: 3 | Status: SHIPPED | OUTPATIENT
Start: 2018-09-07 | End: 2019-02-22 | Stop reason: SDUPTHER

## 2018-09-07 NOTE — PROGRESS NOTES
QUICK REFERENCE INFORMATION:  The ABCs of the Annual Wellness Visit    Subsequent Medicare Wellness Visit    HEALTH RISK ASSESSMENT    1950    Recent Hospitalizations:  No hospitalization(s) within the last year..        Current Medical Providers:  Patient Care Team:  Tatiana Paredes MD as PCP - General  Anamaria, Brayan Ortiz III, MD as PCP - Claims Attributed  Hosea Srinivasan OD (Optometry)  Maged Mirza DPM as Consulting Physician (Podiatry)        Smoking Status:  History   Smoking Status   • Never Smoker   Smokeless Tobacco   • Never Used       Alcohol Consumption:  History   Alcohol Use No       Depression Screen:   PHQ-2/PHQ-9 Depression Screening 9/7/2018   Little interest or pleasure in doing things 1   Feeling down, depressed, or hopeless 1   Trouble falling or staying asleep, or sleeping too much 0   Feeling tired or having little energy 2   Poor appetite or overeating 0   Feeling bad about yourself - or that you are a failure or have let yourself or your family down 0   Trouble concentrating on things, such as reading the newspaper or watching television 0   Moving or speaking so slowly that other people could have noticed. Or the opposite - being so fidgety or restless that you have been moving around a lot more than usual 0   Thoughts that you would be better off dead, or of hurting yourself in some way 0   Total Score 4   If you checked off any problems, how difficult have these problems made it for you to do your work, take care of things at home, or get along with other people? Not difficult at all       Health Habits and Functional and Cognitive Screening:  Functional & Cognitive Status 9/7/2018   Do you have difficulty preparing food and eating? No   Do you have difficulty bathing yourself, getting dressed or grooming yourself? No   Do you have difficulty using the toilet? No   Do you have difficulty moving around from place to place? No   Do you have trouble with steps or getting  out of a bed or a chair? Yes   In the past year have you fallen or experienced a near fall? No   Current Diet Well Balanced Diet   Dental Exam Up to date   Eye Exam Up to date   Exercise (times per week) 0 times per week   Do you need help using the phone?  No   Are you deaf or do you have serious difficulty hearing?  No   Do you need help with transportation? No   Do you need help shopping? No   Do you need help preparing meals?  No   Do you need help with housework?  No   Do you need help with laundry? No   Do you need help taking your medications? No   Do you need help managing money? No   Do you ever drive or ride in a car without wearing a seat belt? No   Have you felt unusual stress, anger or loneliness in the last month? No   Who do you live with? Spouse   If you need help, do you have trouble finding someone available to you? No   Have you been bothered in the last four weeks by sexual problems? No   Do you have difficulty concentrating, remembering or making decisions? Yes           Does the patient have evidence of cognitive impairment? No    Aspirin use counseling: Taking ASA appropriately as indicated      Recent Lab Results:  CMP:  Lab Results   Component Value Date    BUN 18 08/31/2018    CREATININE 0.97 08/31/2018    EGFRIFNONA 57 08/31/2018    BCR 18.6 08/31/2018     08/31/2018    K 4.4 08/31/2018    CO2 25.0 08/31/2018    CALCIUM 9.7 08/31/2018    ALBUMIN 4.40 08/31/2018    BILITOT 0.6 08/31/2018    ALKPHOS 66 08/31/2018    AST 62 (H) 08/31/2018    ALT 63 (H) 08/31/2018     Lipid Panel:  Lab Results   Component Value Date    CHOL 217 (H) 08/31/2018    TRIG 157 08/31/2018    HDL 56 (L) 08/31/2018    LDLHDL 2.31 08/31/2018     HbA1c:       Visual Acuity:  No exam data present    Age-appropriate Screening Schedule:  Refer to the list below for future screening recommendations based on patient's age, sex and/or medical conditions. Orders for these recommended tests are listed in the plan section.  The patient has been provided with a written plan.    Health Maintenance   Topic Date Due   • ZOSTER VACCINE (2 of 3) 12/26/2011   • COLONOSCOPY  11/16/2017   • MAMMOGRAM  03/17/2018   • INFLUENZA VACCINE  08/01/2018   • LIPID PANEL  08/31/2019   • TDAP/TD VACCINES (2 - Td) 08/02/2027   • PNEUMOCOCCAL VACCINES (65+ LOW/MEDIUM RISK)  Completed        Subjective   History of Present Illness    Karey Cazares is a 68 y.o. female who presents for an Subsequent Wellness Visit.    The following portions of the patient's history were reviewed and updated as appropriate: allergies, current medications, past family history, past medical history, past social history, past surgical history and problem list.    Outpatient Medications Prior to Visit   Medication Sig Dispense Refill   • pravastatin (PRAVACHOL) 20 MG tablet Take 1 tablet by mouth Every Night. 90 tablet 3   • aspirin 325 MG tablet Take 650 mg by mouth Daily.     • azelastine (OPTIVAR) 0.05 % ophthalmic solution Administer 1 drop to both eyes 2 (two) times a day.     • B Complex-C (SUPER B COMPLEX PO) Take 1 tablet by mouth.     • Ergocalciferol 2000 UNITS tablet Take 1 tablet by mouth daily.     • IBUPROFEN IB PO Take  by mouth.     • Lecithin 1200 MG capsule Take  by mouth.     • MethylPREDNISolone (MEDROL, FLORENCE,) 4 MG tablet Take as directed 21 tablet 0   • Zinc Acetate (GALZIN) 25 MG capsule Take 1 capsule by mouth daily.       No facility-administered medications prior to visit.        Patient Active Problem List   Diagnosis   • Acute low back pain   • Vertebral compression fracture (CMS/HCC)   • Mixed hyperlipidemia   • Depressive disorder       Advance Care Planning:  has an advance directive - a copy has been provided and is in file    Identification of Risk Factors:  Risk factors include: weight , unhealthy diet, inactivity, increased fall risk, caretaker stress and depression.    Review of Systems    Compared to one year ago, the patient feels her  "physical health is the same.  Compared to one year ago, the patient feels her mental health is the same.    Objective     Physical Exam   Constitutional: She is oriented to person, place, and time. She appears well-developed and well-nourished. No distress.   HENT:   Head: Normocephalic and atraumatic.   Nose: Nose normal.   Mouth/Throat: Oropharynx is clear and moist.   Eyes: Pupils are equal, round, and reactive to light. Conjunctivae and EOM are normal. Right eye exhibits no discharge. Left eye exhibits no discharge.   Neck: No thyromegaly present.   Cardiovascular: Normal rate, regular rhythm, normal heart sounds and intact distal pulses.    Pulmonary/Chest: Effort normal and breath sounds normal.   Lymphadenopathy:     She has no cervical adenopathy.   Neurological: She is alert and oriented to person, place, and time.   Skin: Skin is warm and dry.   Psychiatric: She has a normal mood and affect.   Nursing note and vitals reviewed.      Vitals:    09/07/18 1010   BP: 108/70   BP Location: Left arm   Patient Position: Sitting   Cuff Size: Adult   Pulse: 88   SpO2: 98%   Weight: 77.7 kg (171 lb 3.2 oz)   Height: 167.6 cm (66\")   PainSc:   2   PainLoc: Back       Patient's Body mass index is 27.63 kg/m². BMI is above normal parameters. Recommendations include: exercise counseling and nutrition counseling.      Assessment/Plan   Patient Self-Management and Personalized Health Advice  The patient has been provided with information about: diet, exercise, weight management, fall prevention and mental health concerns and preventive services including:   · Advance directive, Exercise counseling provided, Fall Risk assessment done, Fall Risk plan of care done, Zostavax vaccine (Herpes Zoster).    Visit Diagnoses:    ICD-10-CM ICD-9-CM   1. Medicare annual wellness visit, subsequent Z00.00 V70.0   2. Screening for colon cancer Z12.11 V76.51   3. Mixed hyperlipidemia E78.2 272.2   4. Depressive disorder F32.9 311   5. " Encounter for screening mammogram for breast cancer Z12.31 V76.12       Orders Placed This Encounter   Procedures   • Cologuard - Stool, Per Rectum     Standing Status:   Future     Standing Expiration Date:   9/7/2019       Outpatient Encounter Prescriptions as of 9/7/2018   Medication Sig Dispense Refill   • aspirin 81 MG chewable tablet Chew 81 mg Daily.     • pravastatin (PRAVACHOL) 20 MG tablet Take 1 tablet by mouth Every Night. 90 tablet 3   • [DISCONTINUED] pravastatin (PRAVACHOL) 20 MG tablet Take 1 tablet by mouth Every Night. 90 tablet 3   • escitalopram (LEXAPRO) 10 MG tablet Take 1 tablet by mouth Daily. 30 tablet 1   • [DISCONTINUED] aspirin 325 MG tablet Take 650 mg by mouth Daily.     • [DISCONTINUED] azelastine (OPTIVAR) 0.05 % ophthalmic solution Administer 1 drop to both eyes 2 (two) times a day.     • [DISCONTINUED] B Complex-C (SUPER B COMPLEX PO) Take 1 tablet by mouth.     • [DISCONTINUED] Ergocalciferol 2000 UNITS tablet Take 1 tablet by mouth daily.     • [DISCONTINUED] IBUPROFEN IB PO Take  by mouth.     • [DISCONTINUED] Lecithin 1200 MG capsule Take  by mouth.     • [DISCONTINUED] MethylPREDNISolone (MEDROL, FLORENCE,) 4 MG tablet Take as directed 21 tablet 0   • [DISCONTINUED] Zinc Acetate (GALZIN) 25 MG capsule Take 1 capsule by mouth daily.       No facility-administered encounter medications on file as of 9/7/2018.        Reviewed use of high risk medication in the elderly: not applicable  Reviewed for potential of harmful drug interactions in the elderly: not applicable    Follow Up:  Return in about 6 weeks (around 10/19/2018) for Recheck.     An After Visit Summary and PPPS with all of these plans were given to the patient.    Information has been scanned into chart.  Discussed importance of taking medications as prescribed. Encouraged healthy eating habits with low fat, low salt choices and working towards maintaining a healthy weight. Recommended regular exercise if able as well as  care to prevent falls.

## 2018-09-07 NOTE — PATIENT INSTRUCTIONS
Check at pharmacy on Shingrix shingles vaccine    Medicare Wellness  Personal Prevention Plan of Service     Date of Office Visit:  2018  Encounter Provider:  Tatiana Paredes MD  Place of Service:  [facility]  Patient Name: Karey Cazares  :  1950    As part of the Medicare Wellness portion of your visit today, we are providing you with this personalized preventive plan of services (PPPS). This plan is based upon recommendations of the [place] Preventive Services Task Force (USPSTF) and the Advisory Committee on Immunization Practices (ACIP).    This lists the preventive care services that should be considered, and provides dates of when you are due. Items listed as completed are up-to-date and do not require any further intervention.    Health Maintenance   Topic Date Due   • ZOSTER VACCINE (2 of 3) 2011   • COLONOSCOPY  2017   • MAMMOGRAM  2018   • INFLUENZA VACCINE  2018   • MEDICARE ANNUAL WELLNESS  2018   • LIPID PANEL  2019   • TDAP/TD VACCINES (2 - Td) 2027   • HEPATITIS C SCREENING  Completed   • PNEUMOCOCCAL VACCINES (65+ LOW/MEDIUM RISK)  Completed       Orders Placed This Encounter   Procedures   • Cologuard - Stool, Per Rectum     Standing Status:   Future     Standing Expiration Date:   2019       Return in about 6 weeks (around 10/19/2018) for Recheck.

## 2018-09-07 NOTE — PROGRESS NOTES
Subjective   Karey Cazares is a 68 y.o. female.     Chief Complaint   Patient presents with   • Annual Exam     MWV   • Hyperlipidemia     For review and evaluation of management of chronic medical problems. Labs reviewed. Due for mammogram. Due for colon cancer screening. Is having some depression, has a lot of stress at home. Is tearful. Denies being suicidal.   Hyperlipidemia   This is a chronic problem. The current episode started more than 1 year ago. The problem is controlled. Recent lipid tests were reviewed and are normal. There are no known factors aggravating her hyperlipidemia. Pertinent negatives include no chest pain, myalgias or shortness of breath. Current antihyperlipidemic treatment includes statins. The current treatment provides significant improvement of lipids. There are no compliance problems.       The following portions of the patient's history were reviewed and updated as appropriate: allergies, current medications, past family and social history and problem list.    Outpatient Medications Prior to Visit   Medication Sig Dispense Refill   • pravastatin (PRAVACHOL) 20 MG tablet Take 1 tablet by mouth Every Night. 90 tablet 3   • aspirin 325 MG tablet Take 650 mg by mouth Daily.     • azelastine (OPTIVAR) 0.05 % ophthalmic solution Administer 1 drop to both eyes 2 (two) times a day.     • B Complex-C (SUPER B COMPLEX PO) Take 1 tablet by mouth.     • Ergocalciferol 2000 UNITS tablet Take 1 tablet by mouth daily.     • IBUPROFEN IB PO Take  by mouth.     • Lecithin 1200 MG capsule Take  by mouth.     • MethylPREDNISolone (MEDROL, FLORENCE,) 4 MG tablet Take as directed 21 tablet 0   • Zinc Acetate (GALZIN) 25 MG capsule Take 1 capsule by mouth daily.       No facility-administered medications prior to visit.        Review of Systems   Constitutional: Negative.  Negative for chills, fatigue, fever and unexpected weight change.   HENT: Negative.  Negative for congestion, ear pain, hearing loss,  "nosebleeds, rhinorrhea, sneezing, sore throat and tinnitus.    Eyes: Negative.  Negative for discharge.   Respiratory: Negative.  Negative for cough, shortness of breath and wheezing.    Cardiovascular: Negative.  Negative for chest pain and palpitations.   Gastrointestinal: Negative.  Negative for abdominal pain, constipation, diarrhea, nausea and vomiting.   Endocrine: Negative.    Genitourinary: Negative.  Negative for dysuria, frequency and urgency.   Musculoskeletal: Negative.  Negative for arthralgias, back pain, joint swelling, myalgias and neck pain.   Skin: Negative.  Negative for rash.   Allergic/Immunologic: Negative.    Neurological: Negative.  Negative for dizziness, weakness, numbness and headaches.   Hematological: Negative.  Negative for adenopathy.   Psychiatric/Behavioral: Positive for dysphoric mood. Negative for sleep disturbance. The patient is not nervous/anxious.        Objective     Visit Vitals  /70 (BP Location: Left arm, Patient Position: Sitting, Cuff Size: Adult)   Pulse 88   Ht 167.6 cm (66\")   Wt 77.7 kg (171 lb 3.2 oz)   SpO2 98%   BMI 27.63 kg/m²     Physical Exam   Constitutional: She is oriented to person, place, and time. She appears well-developed and well-nourished. No distress.   HENT:   Head: Normocephalic and atraumatic.   Nose: Nose normal.   Mouth/Throat: Oropharynx is clear and moist.   Eyes: Pupils are equal, round, and reactive to light. Conjunctivae and EOM are normal. Right eye exhibits no discharge. Left eye exhibits no discharge.   Neck: No tracheal deviation present. No thyromegaly present.   Cardiovascular: Normal rate, regular rhythm, normal heart sounds and intact distal pulses.    No murmur heard.  Pulmonary/Chest: Effort normal and breath sounds normal. No respiratory distress. She has no wheezes. She has no rales. She exhibits no tenderness. Right breast exhibits no inverted nipple, no mass, no nipple discharge, no skin change and no tenderness. Left " breast exhibits no inverted nipple, no mass, no nipple discharge, no skin change and no tenderness.   Abdominal: Soft. Bowel sounds are normal. She exhibits no distension and no mass. There is no tenderness. No hernia.   Musculoskeletal: Normal range of motion. She exhibits no deformity.   Lymphadenopathy:     She has no cervical adenopathy.   Neurological: She is alert and oriented to person, place, and time. She has normal reflexes.   Skin: Skin is warm and dry.   Psychiatric: She has a normal mood and affect. Her behavior is normal. Judgment and thought content normal.   Nursing note and vitals reviewed.    Results for orders placed or performed in visit on 08/31/18   Comprehensive Metabolic Panel   Result Value Ref Range    Glucose 106 (H) 60 - 100 mg/dL    BUN 18 7 - 21 mg/dL    Creatinine 0.97 0.50 - 1.00 mg/dL    Sodium 138 137 - 145 mmol/L    Potassium 4.4 3.5 - 5.1 mmol/L    Chloride 103 95 - 110 mmol/L    CO2 25.0 22.0 - 31.0 mmol/L    Calcium 9.7 8.4 - 10.2 mg/dL    Total Protein 7.9 6.3 - 8.6 g/dL    Albumin 4.40 3.40 - 4.80 g/dL    ALT (SGPT) 63 (H) 9 - 52 U/L    AST (SGOT) 62 (H) 14 - 36 U/L    Alkaline Phosphatase 66 38 - 126 U/L    Total Bilirubin 0.6 0.2 - 1.3 mg/dL    eGFR Non  Amer 57 45 - 104 mL/min/1.73    Globulin 3.5 2.3 - 3.5 gm/dL    A/G Ratio 1.3 1.1 - 1.8 g/dL    BUN/Creatinine Ratio 18.6 7.0 - 25.0    Anion Gap 10.0 5.0 - 15.0 mmol/L   Lipid Panel   Result Value Ref Range    Total Cholesterol 217 (H) 0 - 199 mg/dL    Triglycerides 157 20 - 199 mg/dL    HDL Cholesterol 56 (L) 60 - 200 mg/dL    LDL Cholesterol  120 1 - 129 mg/dL    LDL/HDL Ratio 2.31 0.00 - 3.22   Urinalysis without microscopic (no culture) - Urine, Clean Catch   Result Value Ref Range    Color, UA Yellow Yellow, Straw, Dark Yellow, Bree    Appearance, UA Clear Clear    pH, UA 6.0 5.0 - 9.0    Specific Gravity, UA 1.012 1.003 - 1.030    Glucose, UA Negative Negative    Ketones, UA Negative Negative    Bilirubin, UA  Negative Negative    Blood, UA Negative Negative    Protein, UA Negative Negative    Leuk Esterase, UA Trace (A) Negative    Nitrite, UA Negative Negative    Urobilinogen, UA 0.2 E.U./dL 0.2 - 1.0 E.U./dL   Urinalysis, Microscopic Only - Urine, Clean Catch   Result Value Ref Range    RBC, UA 0-2 (A) None Seen /HPF    WBC, UA 0-2 None Seen, 0-2, 3-5 /HPF    Bacteria, UA None Seen None Seen /HPF    Squamous Epithelial Cells, UA None Seen None Seen, 0-2 /HPF    Hyaline Casts, UA 0-2 None Seen /LPF    Methodology Automated Microscopy       Assessment/Plan   Problem List Items Addressed This Visit        Cardiovascular and Mediastinum    Mixed hyperlipidemia (Chronic)    Relevant Medications    pravastatin (PRAVACHOL) 20 MG tablet       Other    Depressive disorder (Chronic)    Relevant Medications    escitalopram (LEXAPRO) 10 MG tablet      Other Visit Diagnoses     Medicare annual wellness visit, subsequent    -  Primary    Screening for colon cancer        Relevant Orders    Cologuard - Stool, Per Rectum    Encounter for screening mammogram for breast cancer              Will notify regarding results. Start escitalopram.     New Medications Ordered This Visit   Medications   • escitalopram (LEXAPRO) 10 MG tablet     Sig: Take 1 tablet by mouth Daily.     Dispense:  30 tablet     Refill:  1   • pravastatin (PRAVACHOL) 20 MG tablet     Sig: Take 1 tablet by mouth Every Night.     Dispense:  90 tablet     Refill:  3     Return in about 6 weeks (around 10/19/2018) for Recheck.

## 2018-09-27 DIAGNOSIS — Z12.11 SCREENING FOR COLON CANCER: ICD-10-CM

## 2018-10-19 ENCOUNTER — OFFICE VISIT (OUTPATIENT)
Dept: FAMILY MEDICINE CLINIC | Facility: CLINIC | Age: 68
End: 2018-10-19

## 2018-10-19 VITALS
WEIGHT: 169.7 LBS | BODY MASS INDEX: 27.27 KG/M2 | HEIGHT: 66 IN | HEART RATE: 81 BPM | SYSTOLIC BLOOD PRESSURE: 130 MMHG | OXYGEN SATURATION: 98 % | DIASTOLIC BLOOD PRESSURE: 82 MMHG

## 2018-10-19 DIAGNOSIS — R74.8 ELEVATED LIVER ENZYMES: Primary | ICD-10-CM

## 2018-10-19 DIAGNOSIS — F32.A DEPRESSIVE DISORDER: Chronic | ICD-10-CM

## 2018-10-19 PROCEDURE — 99213 OFFICE O/P EST LOW 20 MIN: CPT | Performed by: GENERAL PRACTICE

## 2018-10-19 RX ORDER — ESCITALOPRAM OXALATE 10 MG/1
10 TABLET ORAL DAILY
Qty: 90 TABLET | Refills: 3 | Status: SHIPPED | OUTPATIENT
Start: 2018-10-19 | End: 2019-09-09

## 2018-10-19 NOTE — PROGRESS NOTES
Subjective   Karey Cazares is a 68 y.o. female.   Chief Complaint   Patient presents with   • Follow-up   • Depression   • Hyperlipidemia     History of Present Illness     For review and evaluation of management of chronic medical problems. Is doing much better on escitalopram. Handling stress better.     The following portions of the patient's history were reviewed and updated as appropriate: allergies, current medications, past social history and problem list.    Outpatient Medications Prior to Visit   Medication Sig Dispense Refill   • aspirin 81 MG chewable tablet Chew 81 mg Daily.     • pravastatin (PRAVACHOL) 20 MG tablet Take 1 tablet by mouth Every Night. 90 tablet 3   • escitalopram (LEXAPRO) 10 MG tablet Take 1 tablet by mouth Daily. 30 tablet 1     No facility-administered medications prior to visit.        Review of Systems   Constitutional: Negative.  Negative for chills, fatigue, fever and unexpected weight change.   HENT: Negative.  Negative for congestion, ear pain, hearing loss, nosebleeds, rhinorrhea, sneezing, sore throat and tinnitus.    Eyes: Negative.  Negative for discharge.   Respiratory: Negative.  Negative for cough, shortness of breath and wheezing.    Cardiovascular: Negative.  Negative for chest pain and palpitations.   Gastrointestinal: Negative.  Negative for abdominal pain, constipation, diarrhea, nausea and vomiting.   Endocrine: Negative.    Genitourinary: Negative.  Negative for dysuria, frequency and urgency.   Musculoskeletal: Negative.  Negative for arthralgias, back pain, joint swelling, myalgias and neck pain.   Skin: Negative.  Negative for rash.   Allergic/Immunologic: Negative.    Neurological: Negative.  Negative for dizziness, weakness, numbness and headaches.   Hematological: Negative.  Negative for adenopathy.   Psychiatric/Behavioral: Negative.  Negative for dysphoric mood and sleep disturbance. The patient is not nervous/anxious.        Objective   Visit  "Vitals  /82 (BP Location: Left arm, Patient Position: Sitting, Cuff Size: Adult)   Pulse 81   Ht 167.6 cm (66\")   Wt 77 kg (169 lb 11.2 oz)   SpO2 98%   BMI 27.39 kg/m²     Physical Exam   Constitutional: She is oriented to person, place, and time. She appears well-developed and well-nourished. No distress.   HENT:   Head: Normocephalic and atraumatic.   Nose: Nose normal.   Mouth/Throat: Oropharynx is clear and moist.   Eyes: Pupils are equal, round, and reactive to light. Conjunctivae and EOM are normal. Right eye exhibits no discharge. Left eye exhibits no discharge.   Neck: No thyromegaly present.   Cardiovascular: Normal rate, regular rhythm, normal heart sounds and intact distal pulses.    Pulmonary/Chest: Effort normal and breath sounds normal.   Lymphadenopathy:     She has no cervical adenopathy.   Neurological: She is alert and oriented to person, place, and time.   Skin: Skin is warm and dry.   Psychiatric: She has a normal mood and affect.   Nursing note and vitals reviewed.      Assessment/Plan   Problem List Items Addressed This Visit        Other    Depressive disorder (Chronic)    Relevant Medications    escitalopram (LEXAPRO) 10 MG tablet      Other Visit Diagnoses     Elevated liver enzymes    -  Primary    Relevant Orders    Comprehensive Metabolic Panel          Continue current treatment.     New Medications Ordered This Visit   Medications   • escitalopram (LEXAPRO) 10 MG tablet     Sig: Take 1 tablet by mouth Daily.     Dispense:  90 tablet     Refill:  3     Return in about 4 months (around 2/19/2019) for Recheck.  "

## 2019-02-14 ENCOUNTER — LAB (OUTPATIENT)
Dept: LAB | Facility: HOSPITAL | Age: 69
End: 2019-02-14

## 2019-02-14 DIAGNOSIS — R74.8 ELEVATED LIVER ENZYMES: ICD-10-CM

## 2019-02-14 LAB
ALBUMIN SERPL-MCNC: 4.7 G/DL (ref 3.4–4.8)
ALBUMIN/GLOB SERPL: 1.5 G/DL (ref 1.1–1.8)
ALP SERPL-CCNC: 64 U/L (ref 38–126)
ALT SERPL W P-5'-P-CCNC: 42 U/L (ref 9–52)
ANION GAP SERPL CALCULATED.3IONS-SCNC: 7 MMOL/L (ref 5–15)
AST SERPL-CCNC: 46 U/L (ref 14–36)
BILIRUB SERPL-MCNC: 0.4 MG/DL (ref 0.2–1.3)
BUN BLD-MCNC: 14 MG/DL (ref 7–21)
BUN/CREAT SERPL: 13.9 (ref 7–25)
CALCIUM SPEC-SCNC: 10.1 MG/DL (ref 8.4–10.2)
CHLORIDE SERPL-SCNC: 100 MMOL/L (ref 95–110)
CO2 SERPL-SCNC: 30 MMOL/L (ref 22–31)
CREAT BLD-MCNC: 1.01 MG/DL (ref 0.5–1)
GFR SERPL CREATININE-BSD FRML MDRD: 54 ML/MIN/1.73 (ref 45–104)
GLOBULIN UR ELPH-MCNC: 3.2 GM/DL (ref 2.3–3.5)
GLUCOSE BLD-MCNC: 96 MG/DL (ref 60–100)
POTASSIUM BLD-SCNC: 4.1 MMOL/L (ref 3.5–5.1)
PROT SERPL-MCNC: 7.9 G/DL (ref 6.3–8.6)
SODIUM BLD-SCNC: 137 MMOL/L (ref 137–145)

## 2019-02-14 PROCEDURE — 36415 COLL VENOUS BLD VENIPUNCTURE: CPT

## 2019-02-14 PROCEDURE — 80053 COMPREHEN METABOLIC PANEL: CPT

## 2019-02-22 ENCOUNTER — OFFICE VISIT (OUTPATIENT)
Dept: FAMILY MEDICINE CLINIC | Facility: CLINIC | Age: 69
End: 2019-02-22

## 2019-02-22 VITALS
BODY MASS INDEX: 28.19 KG/M2 | HEART RATE: 69 BPM | SYSTOLIC BLOOD PRESSURE: 120 MMHG | OXYGEN SATURATION: 98 % | WEIGHT: 175.4 LBS | HEIGHT: 66 IN | DIASTOLIC BLOOD PRESSURE: 72 MMHG

## 2019-02-22 DIAGNOSIS — R74.8 ELEVATED LIVER ENZYMES: ICD-10-CM

## 2019-02-22 DIAGNOSIS — E78.2 MIXED HYPERLIPIDEMIA: Chronic | ICD-10-CM

## 2019-02-22 DIAGNOSIS — F32.A DEPRESSIVE DISORDER: Primary | Chronic | ICD-10-CM

## 2019-02-22 PROCEDURE — 99213 OFFICE O/P EST LOW 20 MIN: CPT | Performed by: GENERAL PRACTICE

## 2019-02-22 RX ORDER — PRAVASTATIN SODIUM 20 MG
20 TABLET ORAL NIGHTLY
Qty: 90 TABLET | Refills: 3 | Status: SHIPPED | OUTPATIENT
Start: 2019-02-22 | End: 2019-05-23 | Stop reason: SDUPTHER

## 2019-02-22 NOTE — PROGRESS NOTES
Subjective   Karey Cazares is a 69 y.o. female.   Chief Complaint   Patient presents with   • Follow-up   • Hyperlipidemia       History of Present Illness     For review and evaluation of management of chronic medical problems. Labs reviewed. Liver enzymes are better. Depression stable, would like to start weaning off escitalopram.    The following portions of the patient's history were reviewed and updated as appropriate: allergies, current medications, past social history and problem list.    Outpatient Medications Prior to Visit   Medication Sig Dispense Refill   • APPLE CIDER VINEGAR PO Take  by mouth Daily.     • aspirin 81 MG chewable tablet Chew 81 mg Daily.     • escitalopram (LEXAPRO) 10 MG tablet Take 1 tablet by mouth Daily. 90 tablet 3   • MILK THISTLE PO Take  by mouth Daily.     • Multiple Vitamin (MULTI VITAMIN DAILY PO) Take  by mouth Daily.     • pravastatin (PRAVACHOL) 20 MG tablet Take 1 tablet by mouth Every Night. 90 tablet 3     No facility-administered medications prior to visit.        Review of Systems   Constitutional: Negative.  Negative for chills, fatigue, fever and unexpected weight change.   HENT: Negative.  Negative for congestion, ear pain, hearing loss, nosebleeds, rhinorrhea, sneezing, sore throat and tinnitus.    Eyes: Negative.  Negative for discharge.   Respiratory: Negative.  Negative for cough and wheezing.    Cardiovascular: Negative.  Negative for palpitations.   Gastrointestinal: Negative.  Negative for abdominal pain, constipation, diarrhea, nausea and vomiting.   Endocrine: Negative.    Genitourinary: Negative.  Negative for dysuria, frequency and urgency.   Musculoskeletal: Negative.  Negative for arthralgias, back pain, joint swelling and neck pain.   Skin: Negative.  Negative for rash.   Allergic/Immunologic: Negative.    Neurological: Negative.  Negative for dizziness, weakness, numbness and headaches.   Hematological: Negative.  Negative for adenopathy.  "  Psychiatric/Behavioral: Negative.  Negative for dysphoric mood and sleep disturbance. The patient is not nervous/anxious.      Objective   Visit Vitals  /72 (BP Location: Left arm)   Pulse 69   Ht 167.6 cm (66\")   Wt 79.6 kg (175 lb 6.4 oz)   SpO2 98%   BMI 28.31 kg/m²     Physical Exam   Constitutional: She is oriented to person, place, and time. She appears well-developed and well-nourished. No distress.   HENT:   Head: Normocephalic and atraumatic.   Nose: Nose normal.   Mouth/Throat: Oropharynx is clear and moist.   Eyes: Conjunctivae and EOM are normal. Pupils are equal, round, and reactive to light. Right eye exhibits no discharge. Left eye exhibits no discharge.   Neck: No thyromegaly present.   Cardiovascular: Normal rate, regular rhythm, normal heart sounds and intact distal pulses.   Pulmonary/Chest: Effort normal and breath sounds normal.   Lymphadenopathy:     She has no cervical adenopathy.   Neurological: She is alert and oriented to person, place, and time.   Skin: Skin is warm and dry.   Psychiatric: She has a normal mood and affect.   Nursing note and vitals reviewed.      Assessment/Plan   Problem List Items Addressed This Visit        Cardiovascular and Mediastinum    Mixed hyperlipidemia (Chronic)    Relevant Medications    pravastatin (PRAVACHOL) 20 MG tablet    Other Relevant Orders    Comprehensive Metabolic Panel    Lipid Panel    Urinalysis With Microscopic - Urine, Clean Catch       Other    Depressive disorder - Primary (Chronic)      Other Visit Diagnoses     Elevated liver enzymes        Relevant Medications    MILK THISTLE PO    Other Relevant Orders    Comprehensive Metabolic Panel    Lipid Panel    Urinalysis With Microscopic - Urine, Clean Catch          Decrease escitalopram to 1/2 tab daily for at least 2 weeks then every other day for 2 weeks     New Medications Ordered This Visit   Medications   • pravastatin (PRAVACHOL) 20 MG tablet     Sig: Take 1 tablet by mouth Every " Night.     Dispense:  90 tablet     Refill:  3     Return in about 7 months (around 9/9/2019) for Annual physical, medicare wellness visit.

## 2019-02-25 DIAGNOSIS — Z78.0 POST-MENOPAUSAL: Primary | ICD-10-CM

## 2019-02-25 DIAGNOSIS — Z12.31 ENCOUNTER FOR SCREENING MAMMOGRAM FOR MALIGNANT NEOPLASM OF BREAST: ICD-10-CM

## 2019-05-23 ENCOUNTER — TELEPHONE (OUTPATIENT)
Dept: FAMILY MEDICINE CLINIC | Facility: CLINIC | Age: 69
End: 2019-05-23

## 2019-05-23 DIAGNOSIS — E78.2 MIXED HYPERLIPIDEMIA: Chronic | ICD-10-CM

## 2019-05-23 RX ORDER — PRAVASTATIN SODIUM 20 MG
20 TABLET ORAL NIGHTLY
Qty: 90 TABLET | Refills: 3 | Status: SHIPPED | OUTPATIENT
Start: 2019-05-23 | End: 2019-09-09 | Stop reason: SDUPTHER

## 2019-05-23 NOTE — TELEPHONE ENCOUNTER
Karey left a phone message that she needs a refill for Pravastatin 20 mg to [place] in [place] not mail order

## 2019-09-03 ENCOUNTER — LAB (OUTPATIENT)
Dept: LAB | Facility: HOSPITAL | Age: 69
End: 2019-09-03

## 2019-09-03 DIAGNOSIS — E78.2 MIXED HYPERLIPIDEMIA: ICD-10-CM

## 2019-09-03 DIAGNOSIS — R74.8 ELEVATED LIVER ENZYMES: ICD-10-CM

## 2019-09-03 LAB
ALBUMIN SERPL-MCNC: 4.2 G/DL (ref 3.5–5.2)
ALBUMIN/GLOB SERPL: 1.3 G/DL
ALP SERPL-CCNC: 55 U/L (ref 39–117)
ALT SERPL W P-5'-P-CCNC: 33 U/L (ref 1–33)
ANION GAP SERPL CALCULATED.3IONS-SCNC: 11.9 MMOL/L (ref 5–15)
AST SERPL-CCNC: 34 U/L (ref 1–32)
BACTERIA UR QL AUTO: NORMAL /HPF
BILIRUB SERPL-MCNC: 0.4 MG/DL (ref 0.2–1.2)
BILIRUB UR QL STRIP: NEGATIVE
BUN BLD-MCNC: 14 MG/DL (ref 8–23)
BUN/CREAT SERPL: 11.9 (ref 7–25)
CALCIUM SPEC-SCNC: 9.6 MG/DL (ref 8.6–10.5)
CHLORIDE SERPL-SCNC: 102 MMOL/L (ref 98–107)
CHOLEST SERPL-MCNC: 160 MG/DL (ref 0–200)
CLARITY UR: CLEAR
CO2 SERPL-SCNC: 23.1 MMOL/L (ref 22–29)
COLOR UR: YELLOW
CREAT BLD-MCNC: 1.18 MG/DL (ref 0.57–1)
GFR SERPL CREATININE-BSD FRML MDRD: 45 ML/MIN/1.73
GLOBULIN UR ELPH-MCNC: 3.2 GM/DL
GLUCOSE BLD-MCNC: 108 MG/DL (ref 65–99)
GLUCOSE UR STRIP-MCNC: NEGATIVE MG/DL
HDLC SERPL-MCNC: 65 MG/DL (ref 40–60)
HGB UR QL STRIP.AUTO: NEGATIVE
HYALINE CASTS UR QL AUTO: NORMAL /LPF
KETONES UR QL STRIP: NEGATIVE
LDLC SERPL CALC-MCNC: 68 MG/DL (ref 0–100)
LDLC/HDLC SERPL: 1.04 {RATIO}
LEUKOCYTE ESTERASE UR QL STRIP.AUTO: NEGATIVE
NITRITE UR QL STRIP: NEGATIVE
PH UR STRIP.AUTO: 6 [PH] (ref 5–8)
POTASSIUM BLD-SCNC: 4.3 MMOL/L (ref 3.5–5.2)
PROT SERPL-MCNC: 7.4 G/DL (ref 6–8.5)
PROT UR QL STRIP: NEGATIVE
RBC # UR: NORMAL /HPF
REF LAB TEST METHOD: NORMAL
SODIUM BLD-SCNC: 137 MMOL/L (ref 136–145)
SP GR UR STRIP: 1.02 (ref 1–1.03)
SQUAMOUS #/AREA URNS HPF: NORMAL /HPF
TRIGL SERPL-MCNC: 136 MG/DL (ref 0–150)
UROBILINOGEN UR QL STRIP: NORMAL
VLDLC SERPL-MCNC: 27.2 MG/DL (ref 5–40)
WBC UR QL AUTO: NORMAL /HPF

## 2019-09-03 PROCEDURE — 36415 COLL VENOUS BLD VENIPUNCTURE: CPT

## 2019-09-03 PROCEDURE — 81001 URINALYSIS AUTO W/SCOPE: CPT

## 2019-09-03 PROCEDURE — 80061 LIPID PANEL: CPT

## 2019-09-03 PROCEDURE — 80053 COMPREHEN METABOLIC PANEL: CPT

## 2019-09-05 ENCOUNTER — TELEPHONE (OUTPATIENT)
Dept: FAMILY MEDICINE CLINIC | Facility: CLINIC | Age: 69
End: 2019-09-05

## 2019-09-09 ENCOUNTER — TELEPHONE (OUTPATIENT)
Dept: FAMILY MEDICINE CLINIC | Facility: CLINIC | Age: 69
End: 2019-09-09

## 2019-09-09 ENCOUNTER — OFFICE VISIT (OUTPATIENT)
Dept: FAMILY MEDICINE CLINIC | Facility: CLINIC | Age: 69
End: 2019-09-09

## 2019-09-09 VITALS
HEIGHT: 66 IN | DIASTOLIC BLOOD PRESSURE: 80 MMHG | WEIGHT: 168.9 LBS | HEART RATE: 82 BPM | SYSTOLIC BLOOD PRESSURE: 110 MMHG | BODY MASS INDEX: 27.14 KG/M2 | OXYGEN SATURATION: 97 %

## 2019-09-09 DIAGNOSIS — Z12.31 ENCOUNTER FOR SCREENING MAMMOGRAM FOR MALIGNANT NEOPLASM OF BREAST: ICD-10-CM

## 2019-09-09 DIAGNOSIS — E78.2 MIXED HYPERLIPIDEMIA: Chronic | ICD-10-CM

## 2019-09-09 DIAGNOSIS — Z00.00 MEDICARE ANNUAL WELLNESS VISIT, SUBSEQUENT: Primary | ICD-10-CM

## 2019-09-09 DIAGNOSIS — R74.8 ELEVATED LIVER ENZYMES: ICD-10-CM

## 2019-09-09 DIAGNOSIS — Z78.0 POST-MENOPAUSAL: ICD-10-CM

## 2019-09-09 DIAGNOSIS — N28.9 RENAL INSUFFICIENCY, MILD: ICD-10-CM

## 2019-09-09 PROCEDURE — 99214 OFFICE O/P EST MOD 30 MIN: CPT | Performed by: GENERAL PRACTICE

## 2019-09-09 PROCEDURE — G0439 PPPS, SUBSEQ VISIT: HCPCS | Performed by: GENERAL PRACTICE

## 2019-09-09 RX ORDER — LANOLIN ALCOHOL/MO/W.PET/CERES
1000 CREAM (GRAM) TOPICAL DAILY
COMMUNITY

## 2019-09-09 RX ORDER — LANOLIN ALCOHOL/MO/W.PET/CERES
100 CREAM (GRAM) TOPICAL DAILY
COMMUNITY

## 2019-09-09 RX ORDER — PNV NO.95/FERROUS FUM/FOLIC AC 28MG-0.8MG
1 TABLET ORAL
COMMUNITY
End: 2020-03-09

## 2019-09-09 RX ORDER — DIAPER,BRIEF,ADULT, DISPOSABLE
1 EACH MISCELLANEOUS DAILY
COMMUNITY
End: 2020-03-09

## 2019-09-09 RX ORDER — PRAVASTATIN SODIUM 20 MG
20 TABLET ORAL NIGHTLY
Qty: 90 TABLET | Refills: 3 | Status: SHIPPED | OUTPATIENT
Start: 2019-09-09 | End: 2019-10-09 | Stop reason: SDUPTHER

## 2019-09-09 NOTE — PROGRESS NOTES
The ABCs of the Annual Wellness Visit  Subsequent Medicare Wellness Visit    Chief Complaint   Patient presents with   • Annual Exam     labs [facility] medicare wellness       Subjective   History of Present Illness:  Karey Cazares is a 69 y.o. female who presents for a Subsequent Medicare Wellness Visit.    HEALTH RISK ASSESSMENT    Recent Hospitalizations:  No hospitalization(s) within the last year.    Current Medical Providers:  Patient Care Team:  Tatiana Paredes MD as PCP - General  Tatiana Paredes MD as PCP - Claims Attributed  Hosea Srinivasan OD (Optometry)  Maged Mirza DPM as Consulting Physician (Podiatry)    Smoking Status:  Social History     Tobacco Use   Smoking Status Never Smoker   Smokeless Tobacco Never Used       Alcohol Consumption:  Social History     Substance and Sexual Activity   Alcohol Use No       Depression Screen:   PHQ-2/PHQ-9 Depression Screening 9/9/2019   Little interest or pleasure in doing things 0   Feeling down, depressed, or hopeless 1   Trouble falling or staying asleep, or sleeping too much 0   Feeling tired or having little energy 1   Poor appetite or overeating 0   Feeling bad about yourself - or that you are a failure or have let yourself or your family down 1   Trouble concentrating on things, such as reading the newspaper or watching television 0   Moving or speaking so slowly that other people could have noticed. Or the opposite - being so fidgety or restless that you have been moving around a lot more than usual 0   Thoughts that you would be better off dead, or of hurting yourself in some way 0   Total Score 3   If you checked off any problems, how difficult have these problems made it for you to do your work, take care of things at home, or get along with other people? -       Fall Risk Screen:  STEADI Fall Risk Assessment was completed, and patient is at MODERATE risk for falls. Assessment completed on:9/9/2019    Health Habits and Functional  and Cognitive Screening:  Functional & Cognitive Status 9/9/2019   Do you have difficulty preparing food and eating? No   Do you have difficulty bathing yourself, getting dressed or grooming yourself? No   Do you have difficulty using the toilet? No   Do you have difficulty moving around from place to place? No   Do you have trouble with steps or getting out of a bed or a chair? No   Current Diet Well Balanced Diet   Dental Exam Not up to date   Eye Exam Up to date   Exercise (times per week) 0 times per week   Current Exercise Activities Include None   Do you need help using the phone?  No   Are you deaf or do you have serious difficulty hearing?  No   Do you need help with transportation? No   Do you need help shopping? No   Do you need help preparing meals?  No   Do you need help with housework?  No   Do you need help with laundry? No   Do you need help taking your medications? No   Do you need help managing money? No   Do you ever drive or ride in a car without wearing a seat belt? No   Have you felt unusual stress, anger or loneliness in the last month? Yes   Who do you live with? Spouse   If you need help, do you have trouble finding someone available to you? No   Have you been bothered in the last four weeks by sexual problems? No   Do you have difficulty concentrating, remembering or making decisions? No         Does the patient have evidence of cognitive impairment? No    Asprin use counseling:Taking ASA appropriately as indicated    Age-appropriate Screening Schedule:  Refer to the list below for future screening recommendations based on patient's age, sex and/or medical conditions. Orders for these recommended tests are listed in the plan section. The patient has been provided with a written plan.    Health Maintenance   Topic Date Due   • PNEUMOCOCCAL VACCINES (65+ LOW/MEDIUM RISK) (2 of 2 - PCV13) 03/16/2017   • INFLUENZA VACCINE  08/01/2019   • LIPID PANEL  09/03/2020   • MAMMOGRAM  09/07/2020   •  TDAP/TD VACCINES (2 - Td) 08/02/2027   • ZOSTER VACCINE  Completed          The following portions of the patient's history were reviewed and updated as appropriate: allergies, current medications, past family history, past medical history, past social history, past surgical history and problem list.    Outpatient Medications Prior to Visit   Medication Sig Dispense Refill   • aspirin 81 MG chewable tablet Chew 81 mg Daily.     • Ferrous Sulfate (IRON) 325 (65 Fe) MG tablet Take 1 tablet by mouth. Takes Monday Wednesday friday     • FLAXSEED, LINSEED, PO Take 540 mg by mouth Daily.     • Lecithin (LECITHIN-19) 1200 MG capsule Take 1 capsule by mouth Daily.     • MILK THISTLE PO Take  by mouth Daily.     • Multiple Vitamin (MULTI VITAMIN DAILY PO) Take  by mouth Daily.     • vitamin B-12 (CYANOCOBALAMIN) 1000 MCG tablet Take 1,000 mcg by mouth Daily.     • vitamin B-6 (PYRIDOXINE) 50 MG tablet Take 100 mg by mouth Daily.     • pravastatin (PRAVACHOL) 20 MG tablet Take 1 tablet by mouth Every Night. 90 tablet 3   • APPLE CIDER VINEGAR PO Take  by mouth Daily.     • escitalopram (LEXAPRO) 10 MG tablet Take 1 tablet by mouth Daily. 90 tablet 3     No facility-administered medications prior to visit.        Patient Active Problem List   Diagnosis   • Acute low back pain   • Vertebral compression fracture (CMS/HCC)   • Mixed hyperlipidemia   • Depressive disorder       Advanced Care Planning:  Patient does not have an advance directive - information provided to the patient today    Review of Systems   Constitutional: Negative.  Negative for chills, fatigue, fever and unexpected weight change.   HENT: Negative.  Negative for congestion, ear pain, hearing loss, nosebleeds, rhinorrhea, sneezing, sore throat and tinnitus.    Eyes: Negative.  Negative for discharge.   Respiratory: Negative.  Negative for cough, shortness of breath and wheezing.    Cardiovascular: Negative.  Negative for chest pain and palpitations.  "  Gastrointestinal: Negative.  Negative for abdominal pain, constipation, diarrhea, nausea and vomiting.   Endocrine: Negative.    Genitourinary: Negative.  Negative for dysuria, frequency and urgency.   Musculoskeletal: Negative.  Negative for arthralgias, back pain, joint swelling, myalgias and neck pain.   Skin: Negative.  Negative for rash.   Allergic/Immunologic: Negative.    Neurological: Negative.  Negative for dizziness, weakness, numbness and headaches.   Hematological: Negative.  Negative for adenopathy.   Psychiatric/Behavioral: Negative.  Negative for dysphoric mood and sleep disturbance. The patient is not nervous/anxious.        Compared to one year ago, the patient feels her physical health is better.  Compared to one year ago, the patient feels her mental health is better.    Reviewed chart for potential of high risk medication in the elderly: not applicable  Reviewed chart for potential of harmful drug interactions in the elderly:not applicable    Objective         Vitals:    09/09/19 1004   BP: 110/80   Pulse: 82   SpO2: 97%   Weight: 76.6 kg (168 lb 14.4 oz)   Height: 166.4 cm (65.5\")   PainSc: 0-No pain       Body mass index is 27.68 kg/m².  Discussed the patient's BMI with her. The BMI is above average; BMI management plan is completed.    Physical Exam   Constitutional: She is oriented to person, place, and time. She appears well-developed and well-nourished. No distress.   HENT:   Head: Normocephalic and atraumatic.   Nose: Nose normal.   Mouth/Throat: Oropharynx is clear and moist.   Eyes: Conjunctivae and EOM are normal. Pupils are equal, round, and reactive to light. Right eye exhibits no discharge. Left eye exhibits no discharge.   Neck: No thyromegaly present.   Cardiovascular: Normal rate, regular rhythm, normal heart sounds and intact distal pulses.   Pulmonary/Chest: Effort normal and breath sounds normal.   Lymphadenopathy:     She has no cervical adenopathy.   Neurological: She is " alert and oriented to person, place, and time.   Skin: Skin is warm and dry.   Psychiatric: She has a normal mood and affect.   Nursing note and vitals reviewed.    Lab Results   Component Value Date    TRIG 136 09/03/2019    HDL 65 (H) 09/03/2019    LDL 68 09/03/2019    VLDL 27.2 09/03/2019        Assessment/Plan   Medicare Risks and Personalized Health Plan  CMS Preventative Services Quick Reference  Advance Directive Discussion  Breast Cancer/Mammogram Screening  Fall Risk  Immunizations Discussed/Encouraged (specific immunizations; Influenza )  Osteoprorosis Risk    The above risks/problems have been discussed with the patient.  Pertinent information has been shared with the patient in the After Visit Summary.  Follow up plans and orders are seen below in the Assessment/Plan Section.    Diagnoses and all orders for this visit:    1. Medicare annual wellness visit, subsequent (Primary)    2. Mixed hyperlipidemia  -     Comprehensive Metabolic Panel; Future  -     LDL Cholesterol, Direct    3. Encounter for screening mammogram for malignant neoplasm of breast    4. Post-menopausal    5. Elevated liver enzymes  -     Comprehensive Metabolic Panel; Future    6. Renal insufficiency, mild  -     Comprehensive Metabolic Panel; Future      Follow Up:  Return in about 6 months (around 3/9/2020) for Recheck.     An After Visit Summary and PPPS were given to the patient.   Information has been scanned into chart.  Discussed importance of taking medications as prescribed. Encouraged healthy eating habits with low fat, low salt choices and working towards maintaining a healthy weight. Recommended regular exercise if able as well as care to prevent falls including avoiding anything on the floor that they could slip or trip on such as throw rugs, making sure they have a bathmat to step onto when their feet are wet and having grab bars and railings where needed.

## 2019-09-09 NOTE — TELEPHONE ENCOUNTER
Karey called back and wanted me to tell you that she does want to start taking Pravastatin instead of Crestor.  She found an rx for the Pravastatin, but it had .

## 2019-09-09 NOTE — PROGRESS NOTES
Subjective   Karey Cazares is a 69 y.o. female.     Chief Complaint   Patient presents with   • Annual Exam     labs [facility] medicare wellness     For review and evaluation of management of chronic medical problems. Labs reviewed. Due for mammogram and bone density.   Hyperlipidemia   This is a chronic problem. The current episode started more than 1 year ago. The problem is controlled. Recent lipid tests were reviewed and are normal. There are no known factors aggravating her hyperlipidemia. Pertinent negatives include no chest pain, myalgias or shortness of breath. Current antihyperlipidemic treatment includes statins. The current treatment provides significant improvement of lipids. There are no compliance problems.       The following portions of the patient's history were reviewed and updated as appropriate: allergies, current medications, past family and social history and problem list.    Outpatient Medications Prior to Visit   Medication Sig Dispense Refill   • aspirin 81 MG chewable tablet Chew 81 mg Daily.     • Ferrous Sulfate (IRON) 325 (65 Fe) MG tablet Take 1 tablet by mouth. Takes Monday Wednesday friday     • FLAXSEED, LINSEED, PO Take 540 mg by mouth Daily.     • Lecithin (LECITHIN-19) 1200 MG capsule Take 1 capsule by mouth Daily.     • MILK THISTLE PO Take  by mouth Daily.     • Multiple Vitamin (MULTI VITAMIN DAILY PO) Take  by mouth Daily.     • vitamin B-12 (CYANOCOBALAMIN) 1000 MCG tablet Take 1,000 mcg by mouth Daily.     • vitamin B-6 (PYRIDOXINE) 50 MG tablet Take 100 mg by mouth Daily.     • pravastatin (PRAVACHOL) 20 MG tablet Take 1 tablet by mouth Every Night. 90 tablet 3   • APPLE CIDER VINEGAR PO Take  by mouth Daily.     • escitalopram (LEXAPRO) 10 MG tablet Take 1 tablet by mouth Daily. 90 tablet 3     No facility-administered medications prior to visit.        Review of Systems   Constitutional: Negative.  Negative for chills, fatigue, fever and unexpected weight change.  "  HENT: Negative.  Negative for congestion, ear pain, hearing loss, nosebleeds, rhinorrhea, sneezing, sore throat and tinnitus.    Eyes: Negative.  Negative for discharge.   Respiratory: Negative.  Negative for cough, shortness of breath and wheezing.    Cardiovascular: Negative.  Negative for chest pain and palpitations.   Gastrointestinal: Negative.  Negative for abdominal pain, constipation, diarrhea, nausea and vomiting.   Endocrine: Negative.    Genitourinary: Negative.  Negative for dysuria, frequency and urgency.   Musculoskeletal: Negative.  Negative for arthralgias, back pain, joint swelling, myalgias and neck pain.   Skin: Negative.  Negative for rash.   Allergic/Immunologic: Negative.    Neurological: Negative.  Negative for dizziness, weakness, numbness and headaches.   Hematological: Negative.  Negative for adenopathy.   Psychiatric/Behavioral: Negative.  Negative for dysphoric mood and sleep disturbance. The patient is not nervous/anxious.        Objective     Visit Vitals  /80   Pulse 82   Ht 166.4 cm (65.5\")   Wt 76.6 kg (168 lb 14.4 oz)   SpO2 97%   BMI 27.68 kg/m²     Physical Exam   Constitutional: She is oriented to person, place, and time. She appears well-developed and well-nourished. No distress.   HENT:   Head: Normocephalic and atraumatic.   Nose: Nose normal.   Mouth/Throat: Oropharynx is clear and moist.   Eyes: Conjunctivae and EOM are normal. Pupils are equal, round, and reactive to light. Right eye exhibits no discharge. Left eye exhibits no discharge.   Neck: No tracheal deviation present. No thyromegaly present.   Cardiovascular: Normal rate, regular rhythm, normal heart sounds and intact distal pulses.   No murmur heard.  Pulmonary/Chest: Effort normal and breath sounds normal. No respiratory distress. She has no wheezes. She has no rales. She exhibits no tenderness. Right breast exhibits no inverted nipple, no mass, no nipple discharge, no skin change and no tenderness. Left " breast exhibits no inverted nipple, no mass, no nipple discharge, no skin change and no tenderness.   Abdominal: Soft. Bowel sounds are normal. She exhibits no distension and no mass. There is no tenderness. No hernia.   Musculoskeletal: Normal range of motion. She exhibits no deformity.   Lymphadenopathy:     She has no cervical adenopathy.   Neurological: She is alert and oriented to person, place, and time. She has normal reflexes.   Skin: Skin is warm and dry.   Psychiatric: She has a normal mood and affect. Her behavior is normal. Judgment and thought content normal.   Nursing note and vitals reviewed.    Results for orders placed or performed in visit on 09/03/19   Comprehensive Metabolic Panel   Result Value Ref Range    Glucose 108 (H) 65 - 99 mg/dL    BUN 14 8 - 23 mg/dL    Creatinine 1.18 (H) 0.57 - 1.00 mg/dL    Sodium 137 136 - 145 mmol/L    Potassium 4.3 3.5 - 5.2 mmol/L    Chloride 102 98 - 107 mmol/L    CO2 23.1 22.0 - 29.0 mmol/L    Calcium 9.6 8.6 - 10.5 mg/dL    Total Protein 7.4 6.0 - 8.5 g/dL    Albumin 4.20 3.50 - 5.20 g/dL    ALT (SGPT) 33 1 - 33 U/L    AST (SGOT) 34 (H) 1 - 32 U/L    Alkaline Phosphatase 55 39 - 117 U/L    Total Bilirubin 0.4 0.2 - 1.2 mg/dL    eGFR Non African Amer 45 (L) >60 mL/min/1.73    Globulin 3.2 gm/dL    A/G Ratio 1.3 g/dL    BUN/Creatinine Ratio 11.9 7.0 - 25.0    Anion Gap 11.9 5.0 - 15.0 mmol/L   Lipid Panel   Result Value Ref Range    Total Cholesterol 160 0 - 200 mg/dL    Triglycerides 136 0 - 150 mg/dL    HDL Cholesterol 65 (H) 40 - 60 mg/dL    LDL Cholesterol  68 0 - 100 mg/dL    VLDL Cholesterol 27.2 5 - 40 mg/dL    LDL/HDL Ratio 1.04    Urinalysis without microscopic (no culture) - Urine, Clean Catch   Result Value Ref Range    Color, UA Yellow Yellow, Straw    Appearance, UA Clear Clear    pH, UA 6.0 5.0 - 8.0    Specific Gravity, UA 1.018 1.005 - 1.030    Glucose, UA Negative Negative    Ketones, UA Negative Negative    Bilirubin, UA Negative Negative     Blood, UA Negative Negative    Protein, UA Negative Negative    Leuk Esterase, UA Negative Negative    Nitrite, UA Negative Negative    Urobilinogen, UA 0.2 E.U./dL 0.2 - 1.0 E.U./dL   Urinalysis, Microscopic Only - Urine, Clean Catch   Result Value Ref Range    RBC, UA 0-2 None Seen, 0-2 /HPF    WBC, UA 0-2 None Seen, 0-2 /HPF    Bacteria, UA None Seen None Seen /HPF    Squamous Epithelial Cells, UA 0-2 None Seen, 0-2 /HPF    Hyaline Casts, UA None Seen None Seen /LPF    Methodology Automated Microscopy       Assessment/Plan   Problem List Items Addressed This Visit        Cardiovascular and Mediastinum    Mixed hyperlipidemia (Chronic)    Relevant Orders    Comprehensive Metabolic Panel    LDL Cholesterol, Direct      Other Visit Diagnoses     Medicare annual wellness visit, subsequent    -  Primary    Encounter for screening mammogram for malignant neoplasm of breast        Post-menopausal        Elevated liver enzymes        Relevant Orders    Comprehensive Metabolic Panel    Renal insufficiency, mild        Relevant Orders    Comprehensive Metabolic Panel         Will notify regarding results. Continue current treatment. Push fluids. Avoid nsaids.     No orders of the defined types were placed in this encounter.    Return in about 6 months (around 3/9/2020) for Recheck.

## 2019-10-09 ENCOUNTER — TELEPHONE (OUTPATIENT)
Dept: FAMILY MEDICINE CLINIC | Facility: CLINIC | Age: 69
End: 2019-10-09

## 2019-10-09 DIAGNOSIS — E78.2 MIXED HYPERLIPIDEMIA: Chronic | ICD-10-CM

## 2019-10-09 RX ORDER — PRAVASTATIN SODIUM 20 MG
20 TABLET ORAL NIGHTLY
Qty: 90 TABLET | Refills: 3 | Status: SHIPPED | OUTPATIENT
Start: 2019-10-09 | End: 2020-03-09

## 2020-03-02 ENCOUNTER — LAB (OUTPATIENT)
Dept: LAB | Facility: HOSPITAL | Age: 70
End: 2020-03-02

## 2020-03-02 DIAGNOSIS — N28.9 RENAL INSUFFICIENCY, MILD: ICD-10-CM

## 2020-03-02 DIAGNOSIS — E78.2 MIXED HYPERLIPIDEMIA: Chronic | ICD-10-CM

## 2020-03-02 DIAGNOSIS — R74.8 ELEVATED LIVER ENZYMES: ICD-10-CM

## 2020-03-02 PROCEDURE — 83721 ASSAY OF BLOOD LIPOPROTEIN: CPT | Performed by: GENERAL PRACTICE

## 2020-03-02 PROCEDURE — 36415 COLL VENOUS BLD VENIPUNCTURE: CPT

## 2020-03-02 PROCEDURE — 80053 COMPREHEN METABOLIC PANEL: CPT

## 2020-03-03 LAB
ALBUMIN SERPL-MCNC: 4.6 G/DL (ref 3.5–5.2)
ALBUMIN/GLOB SERPL: 1.6 G/DL
ALP SERPL-CCNC: 52 U/L (ref 39–117)
ALT SERPL W P-5'-P-CCNC: 29 U/L (ref 1–33)
ANION GAP SERPL CALCULATED.3IONS-SCNC: 13.3 MMOL/L (ref 5–15)
ARTICHOKE IGE QN: 159 MG/DL (ref 0–100)
AST SERPL-CCNC: 33 U/L (ref 1–32)
BILIRUB SERPL-MCNC: 0.3 MG/DL (ref 0.2–1.2)
BUN BLD-MCNC: 15 MG/DL (ref 8–23)
BUN/CREAT SERPL: 12.6 (ref 7–25)
CALCIUM SPEC-SCNC: 9.8 MG/DL (ref 8.6–10.5)
CHLORIDE SERPL-SCNC: 99 MMOL/L (ref 98–107)
CO2 SERPL-SCNC: 23.7 MMOL/L (ref 22–29)
CREAT BLD-MCNC: 1.19 MG/DL (ref 0.57–1)
GFR SERPL CREATININE-BSD FRML MDRD: 45 ML/MIN/1.73
GLOBULIN UR ELPH-MCNC: 2.8 GM/DL
GLUCOSE BLD-MCNC: 93 MG/DL (ref 65–99)
POTASSIUM BLD-SCNC: 5.4 MMOL/L (ref 3.5–5.2)
PROT SERPL-MCNC: 7.4 G/DL (ref 6–8.5)
SODIUM BLD-SCNC: 136 MMOL/L (ref 136–145)

## 2020-03-09 ENCOUNTER — OFFICE VISIT (OUTPATIENT)
Dept: FAMILY MEDICINE CLINIC | Facility: CLINIC | Age: 70
End: 2020-03-09

## 2020-03-09 VITALS
DIASTOLIC BLOOD PRESSURE: 80 MMHG | HEART RATE: 89 BPM | WEIGHT: 168 LBS | OXYGEN SATURATION: 96 % | HEIGHT: 66 IN | BODY MASS INDEX: 27 KG/M2 | SYSTOLIC BLOOD PRESSURE: 120 MMHG

## 2020-03-09 DIAGNOSIS — E78.2 MIXED HYPERLIPIDEMIA: Primary | Chronic | ICD-10-CM

## 2020-03-09 DIAGNOSIS — N28.9 RENAL INSUFFICIENCY, MILD: Chronic | ICD-10-CM

## 2020-03-09 DIAGNOSIS — Z12.31 ENCOUNTER FOR SCREENING MAMMOGRAM FOR MALIGNANT NEOPLASM OF BREAST: Primary | ICD-10-CM

## 2020-03-09 PROCEDURE — 99213 OFFICE O/P EST LOW 20 MIN: CPT | Performed by: GENERAL PRACTICE

## 2020-03-09 RX ORDER — ROSUVASTATIN CALCIUM 5 MG/1
5 TABLET, COATED ORAL DAILY
Qty: 90 TABLET | Refills: 3 | Status: SHIPPED | OUTPATIENT
Start: 2020-03-09 | End: 2020-10-09 | Stop reason: SDUPTHER

## 2020-03-09 NOTE — PROGRESS NOTES
Subjective   Karey Cazares is a 70 y.o. female.   Chief Complaint   Patient presents with   • Hyperlipidemia     labs     For review and evaluation of management of chronic medical problems. Labs reviewed.   Hyperlipidemia   This is a chronic problem. The current episode started more than 1 year ago. The problem is controlled. Recent lipid tests were reviewed and are normal. There are no known factors aggravating her hyperlipidemia. Pertinent negatives include no chest pain, myalgias or shortness of breath. Current antihyperlipidemic treatment includes statins. The current treatment provides significant improvement of lipids. There are no compliance problems.       The following portions of the patient's history were reviewed and updated as appropriate: allergies, current medications, past social history and problem list.    Outpatient Medications Prior to Visit   Medication Sig Dispense Refill   • aspirin 81 MG chewable tablet Chew 81 mg Daily.     • FLAXSEED, LINSEED, PO Take 540 mg by mouth Daily.     • MILK THISTLE PO Take  by mouth Daily.     • Multiple Vitamin (MULTI VITAMIN DAILY PO) Take  by mouth Daily.     • vitamin B-12 (CYANOCOBALAMIN) 1000 MCG tablet Take 1,000 mcg by mouth Daily.     • vitamin B-6 (PYRIDOXINE) 50 MG tablet Take 100 mg by mouth Daily.     • pravastatin (PRAVACHOL) 20 MG tablet Take 1 tablet by mouth Every Night. 90 tablet 3   • Ferrous Sulfate (IRON) 325 (65 Fe) MG tablet Take 1 tablet by mouth. Takes Monday Wednesday friday     • Lecithin (LECITHIN-19) 1200 MG capsule Take 1 capsule by mouth Daily.       No facility-administered medications prior to visit.        Review of Systems   Constitutional: Negative.  Negative for chills, fatigue, fever and unexpected weight change.   HENT: Negative.  Negative for congestion, ear pain, hearing loss, nosebleeds, rhinorrhea, sneezing, sore throat and tinnitus.    Eyes: Negative.  Negative for discharge.   Respiratory: Negative.  Negative for  "cough, shortness of breath and wheezing.    Cardiovascular: Negative.  Negative for chest pain and palpitations.   Gastrointestinal: Negative.  Negative for abdominal pain, constipation, diarrhea, nausea and vomiting.   Endocrine: Negative.    Genitourinary: Negative.  Negative for dysuria, frequency and urgency.   Musculoskeletal: Negative.  Negative for arthralgias, back pain, joint swelling, myalgias and neck pain.   Skin: Negative.  Negative for rash.   Allergic/Immunologic: Negative.    Neurological: Negative.  Negative for dizziness, weakness, numbness and headaches.   Hematological: Negative.  Negative for adenopathy.   Psychiatric/Behavioral: Negative.  Negative for dysphoric mood and sleep disturbance. The patient is not nervous/anxious.        Objective   Visit Vitals  /80   Pulse 89   Ht 166.4 cm (65.5\")   Wt 76.2 kg (168 lb)   SpO2 96%   BMI 27.53 kg/m²     Physical Exam   Constitutional: She is oriented to person, place, and time. She appears well-developed and well-nourished. No distress.   HENT:   Head: Normocephalic and atraumatic.   Nose: Nose normal.   Mouth/Throat: Oropharynx is clear and moist.   Eyes: Pupils are equal, round, and reactive to light. Conjunctivae and EOM are normal. Right eye exhibits no discharge. Left eye exhibits no discharge.   Neck: No thyromegaly present.   Cardiovascular: Normal rate, regular rhythm, normal heart sounds and intact distal pulses.   Pulmonary/Chest: Effort normal and breath sounds normal.   Lymphadenopathy:     She has no cervical adenopathy.   Neurological: She is alert and oriented to person, place, and time.   Skin: Skin is warm and dry.   Psychiatric: She has a normal mood and affect.   Nursing note and vitals reviewed.    Results for orders placed or performed in visit on 03/02/20   Comprehensive Metabolic Panel   Result Value Ref Range    Glucose 93 65 - 99 mg/dL    BUN 15 8 - 23 mg/dL    Creatinine 1.19 (H) 0.57 - 1.00 mg/dL    Sodium 136 136 - " 145 mmol/L    Potassium 5.4 (H) 3.5 - 5.2 mmol/L    Chloride 99 98 - 107 mmol/L    CO2 23.7 22.0 - 29.0 mmol/L    Calcium 9.8 8.6 - 10.5 mg/dL    Total Protein 7.4 6.0 - 8.5 g/dL    Albumin 4.60 3.50 - 5.20 g/dL    ALT (SGPT) 29 1 - 33 U/L    AST (SGOT) 33 (H) 1 - 32 U/L    Alkaline Phosphatase 52 39 - 117 U/L    Total Bilirubin 0.3 0.2 - 1.2 mg/dL    eGFR Non African Amer 45 (L) >60 mL/min/1.73    Globulin 2.8 gm/dL    A/G Ratio 1.6 g/dL    BUN/Creatinine Ratio 12.6 7.0 - 25.0    Anion Gap 13.3 5.0 - 15.0 mmol/L      Notes brought forward are reviewed and updated if indicated.     Assessment/Plan   Problem List Items Addressed This Visit        Cardiovascular and Mediastinum    Mixed hyperlipidemia - Primary (Chronic)    Relevant Medications    rosuvastatin (CRESTOR) 5 MG tablet    Other Relevant Orders    Comprehensive Metabolic Panel    Lipid Panel    Urinalysis With Culture If Indicated -      Other Visit Diagnoses     Renal insufficiency, mild  (Chronic)            Start crestor. Push fluids. Avoid nsaids.     New Medications Ordered This Visit   Medications   • rosuvastatin (CRESTOR) 5 MG tablet     Sig: Take 1 tablet by mouth Daily.     Dispense:  90 tablet     Refill:  3     Return in about 6 months (around 9/10/2020) for Annual physical, medicare wellness visit.

## 2020-09-14 DIAGNOSIS — Z12.31 ENCOUNTER FOR SCREENING MAMMOGRAM FOR MALIGNANT NEOPLASM OF BREAST: Primary | ICD-10-CM

## 2020-09-29 ENCOUNTER — LAB (OUTPATIENT)
Dept: LAB | Facility: HOSPITAL | Age: 70
End: 2020-09-29

## 2020-09-29 DIAGNOSIS — E78.2 MIXED HYPERLIPIDEMIA: Chronic | ICD-10-CM

## 2020-09-29 LAB
BILIRUB UR QL STRIP: NEGATIVE
CLARITY UR: CLEAR
COLOR UR: YELLOW
GLUCOSE UR STRIP-MCNC: NEGATIVE MG/DL
HGB UR QL STRIP.AUTO: NEGATIVE
KETONES UR QL STRIP: NEGATIVE
LEUKOCYTE ESTERASE UR QL STRIP.AUTO: NEGATIVE
NITRITE UR QL STRIP: NEGATIVE
PH UR STRIP.AUTO: 6.5 [PH] (ref 5–8)
PROT UR QL STRIP: NEGATIVE
SP GR UR STRIP: <=1.005 (ref 1–1.03)
UROBILINOGEN UR QL STRIP: NORMAL

## 2020-09-29 PROCEDURE — 81003 URINALYSIS AUTO W/O SCOPE: CPT

## 2020-10-09 ENCOUNTER — OFFICE VISIT (OUTPATIENT)
Dept: FAMILY MEDICINE CLINIC | Facility: CLINIC | Age: 70
End: 2020-10-09

## 2020-10-09 VITALS
WEIGHT: 167 LBS | OXYGEN SATURATION: 97 % | DIASTOLIC BLOOD PRESSURE: 81 MMHG | BODY MASS INDEX: 26.84 KG/M2 | SYSTOLIC BLOOD PRESSURE: 120 MMHG | HEIGHT: 66 IN | HEART RATE: 83 BPM

## 2020-10-09 DIAGNOSIS — E78.2 MIXED HYPERLIPIDEMIA: Chronic | ICD-10-CM

## 2020-10-09 DIAGNOSIS — Z23 NEED FOR INFLUENZA VACCINATION: ICD-10-CM

## 2020-10-09 DIAGNOSIS — Z12.31 ENCOUNTER FOR SCREENING MAMMOGRAM FOR MALIGNANT NEOPLASM OF BREAST: ICD-10-CM

## 2020-10-09 DIAGNOSIS — Z00.00 MEDICARE ANNUAL WELLNESS VISIT, SUBSEQUENT: Primary | ICD-10-CM

## 2020-10-09 PROCEDURE — 90694 VACC AIIV4 NO PRSRV 0.5ML IM: CPT | Performed by: GENERAL PRACTICE

## 2020-10-09 PROCEDURE — 99212 OFFICE O/P EST SF 10 MIN: CPT | Performed by: GENERAL PRACTICE

## 2020-10-09 PROCEDURE — G0008 ADMIN INFLUENZA VIRUS VAC: HCPCS | Performed by: GENERAL PRACTICE

## 2020-10-09 PROCEDURE — G0439 PPPS, SUBSEQ VISIT: HCPCS | Performed by: GENERAL PRACTICE

## 2020-10-09 RX ORDER — ROSUVASTATIN CALCIUM 5 MG/1
5 TABLET, COATED ORAL DAILY
Qty: 90 TABLET | Refills: 3 | Status: SHIPPED | OUTPATIENT
Start: 2020-10-09 | End: 2021-11-23 | Stop reason: SDUPTHER

## 2020-10-09 NOTE — PROGRESS NOTES
Subjective   Karey Cazares is a 70 y.o. female.     Chief Complaint   Patient presents with   • Medicare Wellness-subsequent   • Annual Exam   • Hyperlipidemia     For review and evaluation of management of chronic medical problems. Labs reviewed. Due for mammogram.    Hyperlipidemia  This is a chronic problem. The current episode started more than 1 year ago. The problem is controlled. Recent lipid tests were reviewed and are normal. There are no known factors aggravating her hyperlipidemia. Pertinent negatives include no chest pain, myalgias or shortness of breath. Current antihyperlipidemic treatment includes statins. The current treatment provides significant improvement of lipids. There are no compliance problems.       The following portions of the patient's history were reviewed and updated as appropriate: allergies, current medications, past family and social history and problem list.    Outpatient Medications Prior to Visit   Medication Sig Dispense Refill   • APPLE CIDER VINEGAR PO Take  by mouth.     • aspirin 81 MG chewable tablet Chew 81 mg Daily.     • Calcium-Magnesium-Zinc 333-133-5 MG tablet Take  by mouth.     • MILK THISTLE PO Take  by mouth Daily.     • Multiple Vitamin (MULTI VITAMIN DAILY PO) Take  by mouth Daily.     • vitamin B-12 (CYANOCOBALAMIN) 1000 MCG tablet Take 1,000 mcg by mouth Daily.     • vitamin B-6 (PYRIDOXINE) 50 MG tablet Take 100 mg by mouth Daily.     • rosuvastatin (CRESTOR) 5 MG tablet Take 1 tablet by mouth Daily. 90 tablet 3   • FLAXSEED, LINSEED, PO Take 540 mg by mouth Daily.       No facility-administered medications prior to visit.        Review of Systems   Constitutional: Negative.  Negative for chills, fatigue, fever and unexpected weight change.   HENT: Negative.  Negative for congestion, ear pain, hearing loss, nosebleeds, rhinorrhea, sneezing, sore throat and tinnitus.    Eyes: Negative.  Negative for discharge.   Respiratory: Negative.  Negative for cough,  "shortness of breath and wheezing.    Cardiovascular: Negative.  Negative for chest pain and palpitations.   Gastrointestinal: Negative.  Negative for abdominal pain, constipation, diarrhea, nausea and vomiting.   Endocrine: Negative.    Genitourinary: Negative.  Negative for dysuria, frequency and urgency.   Musculoskeletal: Negative.  Negative for arthralgias, back pain, joint swelling, myalgias and neck pain.   Skin: Negative.  Negative for rash.   Allergic/Immunologic: Negative.    Neurological: Negative.  Negative for dizziness, weakness, numbness and headaches.   Hematological: Negative.  Negative for adenopathy.   Psychiatric/Behavioral: Negative.  Negative for dysphoric mood and sleep disturbance. The patient is not nervous/anxious.        Objective     Visit Vitals  /81 (BP Location: Left arm)   Pulse 83   Ht 166.4 cm (65.5\")   Wt 75.8 kg (167 lb)   SpO2 97%   BMI 27.37 kg/m²     Physical Exam  Vitals signs and nursing note reviewed.   Constitutional:       General: She is not in acute distress.     Appearance: She is well-developed.   HENT:      Head: Normocephalic and atraumatic.      Nose: Nose normal.   Eyes:      General:         Right eye: No discharge.         Left eye: No discharge.      Conjunctiva/sclera: Conjunctivae normal.      Pupils: Pupils are equal, round, and reactive to light.   Neck:      Thyroid: No thyromegaly.      Trachea: No tracheal deviation.   Cardiovascular:      Rate and Rhythm: Normal rate and regular rhythm.      Heart sounds: Normal heart sounds. No murmur.   Pulmonary:      Effort: Pulmonary effort is normal. No respiratory distress.      Breath sounds: Normal breath sounds. No wheezing or rales.   Chest:      Chest wall: No tenderness.      Breasts:         Right: No inverted nipple, mass, nipple discharge, skin change or tenderness.         Left: No inverted nipple, mass, nipple discharge, skin change or tenderness.   Abdominal:      General: Bowel sounds are normal. " There is no distension.      Palpations: Abdomen is soft. There is no mass.      Tenderness: There is no abdominal tenderness.      Hernia: No hernia is present.   Musculoskeletal: Normal range of motion.         General: No deformity.   Lymphadenopathy:      Cervical: No cervical adenopathy.   Skin:     General: Skin is warm and dry.   Neurological:      Mental Status: She is alert and oriented to person, place, and time.      Deep Tendon Reflexes: Reflexes are normal and symmetric.   Psychiatric:         Behavior: Behavior normal.         Thought Content: Thought content normal.         Judgment: Judgment normal.       Results for orders placed or performed in visit on 09/29/20   Urinalysis With Culture If Indicated - Urine, Clean Catch    Specimen: Urine, Clean Catch   Result Value Ref Range    Color, UA Yellow Yellow, Straw    Appearance, UA Clear Clear    pH, UA 6.5 5.0 - 8.0    Specific Gravity, UA <=1.005 1.005 - 1.030    Glucose, UA Negative Negative    Ketones, UA Negative Negative    Bilirubin, UA Negative Negative    Blood, UA Negative Negative    Protein, UA Negative Negative    Leuk Esterase, UA Negative Negative    Nitrite, UA Negative Negative    Urobilinogen, UA 0.2 E.U./dL 0.2 - 1.0 E.U./dL      Notes brought forward are reviewed and updated if indicated.     Assessment/Plan   Problem List Items Addressed This Visit        Cardiovascular and Mediastinum    Mixed hyperlipidemia - Primary (Chronic)    Relevant Medications    rosuvastatin (Crestor) 5 MG tablet    Other Relevant Orders    Lipid Panel    Comprehensive Metabolic Panel           New Medications Ordered This Visit   Medications   • rosuvastatin (Crestor) 5 MG tablet     Sig: Take 1 tablet by mouth Daily.     Dispense:  90 tablet     Refill:  3     Return in about 1 year (around 10/9/2021) for Annual physical, medicare wellness visit.

## 2020-10-09 NOTE — PROGRESS NOTES
The ABCs of the Annual Wellness Visit  Subsequent Medicare Wellness Visit    Chief Complaint   Patient presents with   • Medicare Wellness-subsequent   • Annual Exam   • Hyperlipidemia       Subjective   History of Present Illness:  Karey Cazares is a 70 y.o. female who presents for a Subsequent Medicare Wellness Visit.    HEALTH RISK ASSESSMENT    Recent Hospitalizations:  No hospitalization(s) within the last year.    Current Medical Providers:  Patient Care Team:  Tatiana Paredes MD as PCP - General  Tatiana Paredes MD as PCP - Claims Attributed  Hosea Srinivasan OD (Optometry)  Maged Mirza DPM as Consulting Physician (Podiatry)    Smoking Status:  Social History     Tobacco Use   Smoking Status Never Smoker   Smokeless Tobacco Never Used       Alcohol Consumption:  Social History     Substance and Sexual Activity   Alcohol Use No       Depression Screen:   PHQ-2/PHQ-9 Depression Screening 10/9/2020   Little interest or pleasure in doing things 0   Feeling down, depressed, or hopeless 1   Trouble falling or staying asleep, or sleeping too much 0   Feeling tired or having little energy 0   Poor appetite or overeating 0   Feeling bad about yourself - or that you are a failure or have let yourself or your family down 0   Trouble concentrating on things, such as reading the newspaper or watching television 0   Moving or speaking so slowly that other people could have noticed. Or the opposite - being so fidgety or restless that you have been moving around a lot more than usual 0   Thoughts that you would be better off dead, or of hurting yourself in some way 0   Total Score 1   If you checked off any problems, how difficult have these problems made it for you to do your work, take care of things at home, or get along with other people? Somewhat difficult       Fall Risk Screen:  STEADI Fall Risk Assessment was completed, and patient is at LOW risk for falls.Assessment completed  on:10/9/2020    Health Habits and Functional and Cognitive Screening:  Functional & Cognitive Status 10/9/2020   Do you have difficulty preparing food and eating? No   Do you have difficulty bathing yourself, getting dressed or grooming yourself? No   Do you have difficulty using the toilet? No   Do you have difficulty moving around from place to place? No   Do you have trouble with steps or getting out of a bed or a chair? No   Current Diet Well Balanced Diet   Dental Exam Up to date   Eye Exam Not up to date   Exercise (times per week) 0 times per week   Current Exercise Activities Include -   Do you need help using the phone?  No   Are you deaf or do you have serious difficulty hearing?  No   Do you need help with transportation? No   Do you need help shopping? No   Do you need help preparing meals?  No   Do you need help with housework?  No   Do you need help with laundry? No   Do you need help taking your medications? No   Do you need help managing money? No   Do you ever drive or ride in a car without wearing a seat belt? No   Have you felt unusual stress, anger or loneliness in the last month? No   Who do you live with? Spouse   If you need help, do you have trouble finding someone available to you? No   Have you been bothered in the last four weeks by sexual problems? No   Do you have difficulty concentrating, remembering or making decisions? Yes         Does the patient have evidence of cognitive impairment? No    Asprin use counseling:Taking ASA appropriately as indicated    Age-appropriate Screening Schedule:  Refer to the list below for future screening recommendations based on patient's age, sex and/or medical conditions. Orders for these recommended tests are listed in the plan section. The patient has been provided with a written plan.    Health Maintenance   Topic Date Due   • INFLUENZA VACCINE  08/01/2020   • LIPID PANEL  03/02/2021   • MAMMOGRAM  09/09/2021   • TDAP/TD VACCINES (2 - Td) 08/02/2027    • ZOSTER VACCINE  Completed          The following portions of the patient's history were reviewed and updated as appropriate: allergies, current medications, past family history, past medical history, past social history, past surgical history and problem list.    Outpatient Medications Prior to Visit   Medication Sig Dispense Refill   • APPLE CIDER VINEGAR PO Take  by mouth.     • aspirin 81 MG chewable tablet Chew 81 mg Daily.     • Calcium-Magnesium-Zinc 333-133-5 MG tablet Take  by mouth.     • MILK THISTLE PO Take  by mouth Daily.     • Multiple Vitamin (MULTI VITAMIN DAILY PO) Take  by mouth Daily.     • rosuvastatin (CRESTOR) 5 MG tablet Take 1 tablet by mouth Daily. 90 tablet 3   • vitamin B-12 (CYANOCOBALAMIN) 1000 MCG tablet Take 1,000 mcg by mouth Daily.     • vitamin B-6 (PYRIDOXINE) 50 MG tablet Take 100 mg by mouth Daily.     • FLAXSEED, LINSEED, PO Take 540 mg by mouth Daily.       No facility-administered medications prior to visit.        Patient Active Problem List   Diagnosis   • Acute low back pain   • Vertebral compression fracture (CMS/HCC)   • Mixed hyperlipidemia   • Depressive disorder       Advanced Care Planning:  ACP discussion was held with the patient during this visit. Patient has an advance directive in EMR which is still valid.     Review of Systems   Constitutional: Negative.  Negative for chills, fatigue, fever and unexpected weight change.   HENT: Negative.  Negative for congestion, ear pain, hearing loss, nosebleeds, rhinorrhea, sneezing, sore throat and tinnitus.    Eyes: Negative.  Negative for discharge.   Respiratory: Negative.  Negative for cough, shortness of breath and wheezing.    Cardiovascular: Negative.  Negative for chest pain and palpitations.   Gastrointestinal: Negative.  Negative for abdominal pain, constipation, diarrhea, nausea and vomiting.   Endocrine: Negative.    Genitourinary: Negative.  Negative for dysuria, frequency and urgency.   Musculoskeletal:  "Negative.  Negative for arthralgias, back pain, joint swelling, myalgias and neck pain.   Skin: Negative.  Negative for rash.   Allergic/Immunologic: Negative.    Neurological: Negative.  Negative for dizziness, weakness, numbness and headaches.   Hematological: Negative.  Negative for adenopathy.   Psychiatric/Behavioral: Negative.  Negative for dysphoric mood and sleep disturbance. The patient is not nervous/anxious.        Compared to one year ago, the patient feels her physical health is the same.  Compared to one year ago, the patient feels her mental health is the same.    Reviewed chart for potential of high risk medication in the elderly: not applicable  Reviewed chart for potential of harmful drug interactions in the elderly:not applicable    Objective         Vitals:    10/09/20 0952   BP: 120/81   BP Location: Left arm   Pulse: 83   SpO2: 97%   Weight: 75.8 kg (167 lb)   Height: 166.4 cm (65.5\")   PainSc:   3   PainLoc: Hip       Body mass index is 27.37 kg/m².  Discussed the patient's BMI with her. The BMI is above average; BMI management plan is completed.    Physical Exam  Vitals signs and nursing note reviewed.   Constitutional:       General: She is not in acute distress.     Appearance: She is well-developed.   HENT:      Head: Normocephalic and atraumatic.      Nose: Nose normal.   Eyes:      General:         Right eye: No discharge.         Left eye: No discharge.      Conjunctiva/sclera: Conjunctivae normal.      Pupils: Pupils are equal, round, and reactive to light.   Neck:      Thyroid: No thyromegaly.   Cardiovascular:      Rate and Rhythm: Normal rate and regular rhythm.      Heart sounds: Normal heart sounds.   Pulmonary:      Effort: Pulmonary effort is normal.      Breath sounds: Normal breath sounds.   Lymphadenopathy:      Cervical: No cervical adenopathy.   Skin:     General: Skin is warm and dry.   Neurological:      Mental Status: She is alert and oriented to person, place, and time. "               Assessment/Plan   Medicare Risks and Personalized Health Plan  CMS Preventative Services Quick Reference  Advance Directive Discussion  Breast Cancer/Mammogram Screening  Depression/Dysphoria  Fall Risk  Immunizations Discussed/Encouraged (specific immunizations; Influenza )  Obesity/Overweight     The above risks/problems have been discussed with the patient.  Pertinent information has been shared with the patient in the After Visit Summary.  Follow up plans and orders are seen below in the Assessment/Plan Section.    There are no diagnoses linked to this encounter.  Follow Up:  No follow-ups on file.     An After Visit Summary and PPPS were given to the patient.    Information has been scanned into chart. Discussed importance of taking medications as prescribed. Encouraged healthy eating habits with low fat, low salt choices and working towards maintaining a healthy weight. Recommended regular exercise if able as well as care to prevent falls including avoiding anything on the floor that they could slip or trip on such as throw rugs, making sure they have a bathmat to step onto when their feet are wet and having grab bars and railings where needed.

## 2020-10-21 ENCOUNTER — LAB (OUTPATIENT)
Dept: LAB | Facility: HOSPITAL | Age: 70
End: 2020-10-21

## 2020-10-21 DIAGNOSIS — E78.2 MIXED HYPERLIPIDEMIA: Chronic | ICD-10-CM

## 2020-10-21 LAB
ALBUMIN SERPL-MCNC: 3.9 G/DL (ref 3.5–5.2)
ALBUMIN/GLOB SERPL: 1.2 G/DL
ALP SERPL-CCNC: 51 U/L (ref 39–117)
ALT SERPL W P-5'-P-CCNC: 22 U/L (ref 1–33)
ANION GAP SERPL CALCULATED.3IONS-SCNC: 10.7 MMOL/L (ref 5–15)
AST SERPL-CCNC: 25 U/L (ref 1–32)
BILIRUB SERPL-MCNC: 0.4 MG/DL (ref 0–1.2)
BUN SERPL-MCNC: 11 MG/DL (ref 8–23)
BUN/CREAT SERPL: 10.7 (ref 7–25)
CALCIUM SPEC-SCNC: 9.7 MG/DL (ref 8.6–10.5)
CHLORIDE SERPL-SCNC: 103 MMOL/L (ref 98–107)
CHOLEST SERPL-MCNC: 177 MG/DL (ref 0–200)
CO2 SERPL-SCNC: 24.3 MMOL/L (ref 22–29)
CREAT SERPL-MCNC: 1.03 MG/DL (ref 0.57–1)
GFR SERPL CREATININE-BSD FRML MDRD: 53 ML/MIN/1.73
GLOBULIN UR ELPH-MCNC: 3.3 GM/DL
GLUCOSE SERPL-MCNC: 103 MG/DL (ref 65–99)
HDLC SERPL-MCNC: 67 MG/DL (ref 40–60)
LDLC SERPL CALC-MCNC: 86 MG/DL (ref 0–100)
LDLC/HDLC SERPL: 1.22 {RATIO}
POTASSIUM SERPL-SCNC: 5.1 MMOL/L (ref 3.5–5.2)
PROT SERPL-MCNC: 7.2 G/DL (ref 6–8.5)
SODIUM SERPL-SCNC: 138 MMOL/L (ref 136–145)
TRIGL SERPL-MCNC: 141 MG/DL (ref 0–150)
VLDLC SERPL-MCNC: 24 MG/DL (ref 5–40)

## 2020-10-21 PROCEDURE — 80053 COMPREHEN METABOLIC PANEL: CPT

## 2020-10-21 PROCEDURE — 36415 COLL VENOUS BLD VENIPUNCTURE: CPT

## 2020-10-21 PROCEDURE — 80061 LIPID PANEL: CPT

## 2020-11-02 DIAGNOSIS — Z12.31 ENCOUNTER FOR SCREENING MAMMOGRAM FOR MALIGNANT NEOPLASM OF BREAST: ICD-10-CM

## 2020-11-02 DIAGNOSIS — Z78.0 POST-MENOPAUSAL: Primary | ICD-10-CM

## 2020-11-25 PROCEDURE — U0003 INFECTIOUS AGENT DETECTION BY NUCLEIC ACID (DNA OR RNA); SEVERE ACUTE RESPIRATORY SYNDROME CORONAVIRUS 2 (SARS-COV-2) (CORONAVIRUS DISEASE [COVID-19]), AMPLIFIED PROBE TECHNIQUE, MAKING USE OF HIGH THROUGHPUT TECHNOLOGIES AS DESCRIBED BY CMS-2020-01-R: HCPCS | Performed by: NURSE PRACTITIONER

## 2020-12-01 ENCOUNTER — APPOINTMENT (OUTPATIENT)
Dept: GENERAL RADIOLOGY | Facility: HOSPITAL | Age: 70
End: 2020-12-01

## 2020-12-01 ENCOUNTER — HOSPITAL ENCOUNTER (EMERGENCY)
Facility: HOSPITAL | Age: 70
Discharge: HOME | End: 2020-12-01
Attending: EMERGENCY MEDICINE | Admitting: EMERGENCY MEDICINE

## 2020-12-01 VITALS
HEIGHT: 68 IN | BODY MASS INDEX: 24.66 KG/M2 | RESPIRATION RATE: 18 BRPM | TEMPERATURE: 95.6 F | HEART RATE: 90 BPM | OXYGEN SATURATION: 96 % | DIASTOLIC BLOOD PRESSURE: 74 MMHG | WEIGHT: 162.7 LBS | SYSTOLIC BLOOD PRESSURE: 128 MMHG

## 2020-12-01 DIAGNOSIS — R53.83 FATIGUE, UNSPECIFIED TYPE: ICD-10-CM

## 2020-12-01 DIAGNOSIS — R05.9 COUGH: ICD-10-CM

## 2020-12-01 DIAGNOSIS — U07.1 COVID-19 VIRUS INFECTION: Primary | ICD-10-CM

## 2020-12-01 DIAGNOSIS — R63.0 ANOREXIA: ICD-10-CM

## 2020-12-01 LAB
ALBUMIN SERPL-MCNC: 4.2 G/DL (ref 3.5–5.2)
ALBUMIN/GLOB SERPL: 1.5 G/DL
ALP SERPL-CCNC: 67 U/L (ref 39–117)
ALT SERPL W P-5'-P-CCNC: 29 U/L (ref 1–33)
ANION GAP SERPL CALCULATED.3IONS-SCNC: 14 MMOL/L (ref 5–15)
AST SERPL-CCNC: 49 U/L (ref 1–32)
B PARAPERT DNA SPEC QL NAA+PROBE: NOT DETECTED
B PERT DNA SPEC QL NAA+PROBE: NOT DETECTED
BASOPHILS # BLD AUTO: 0.01 10*3/MM3 (ref 0–0.2)
BASOPHILS NFR BLD AUTO: 0.2 % (ref 0–1.5)
BILIRUB SERPL-MCNC: 0.3 MG/DL (ref 0–1.2)
BUN SERPL-MCNC: 13 MG/DL (ref 8–23)
BUN/CREAT SERPL: 12.3 (ref 7–25)
C PNEUM DNA NPH QL NAA+NON-PROBE: NOT DETECTED
CALCIUM SPEC-SCNC: 9 MG/DL (ref 8.6–10.5)
CHLORIDE SERPL-SCNC: 98 MMOL/L (ref 98–107)
CO2 SERPL-SCNC: 23 MMOL/L (ref 22–29)
CREAT SERPL-MCNC: 1.06 MG/DL (ref 0.57–1)
CRP SERPL-MCNC: 2.87 MG/DL (ref 0–0.5)
DEPRECATED RDW RBC AUTO: 43.4 FL (ref 37–54)
EOSINOPHIL # BLD AUTO: 0 10*3/MM3 (ref 0–0.4)
EOSINOPHIL NFR BLD AUTO: 0 % (ref 0.3–6.2)
ERYTHROCYTE [DISTWIDTH] IN BLOOD BY AUTOMATED COUNT: 13.2 % (ref 12.3–15.4)
FERRITIN SERPL-MCNC: 675 NG/ML (ref 13–150)
FLUBV RNA ISLT QL NAA+PROBE: NOT DETECTED
GFR SERPL CREATININE-BSD FRML MDRD: 51 ML/MIN/1.73
GLOBULIN UR ELPH-MCNC: 2.8 GM/DL
GLUCOSE SERPL-MCNC: 128 MG/DL (ref 65–99)
HADV DNA SPEC NAA+PROBE: NOT DETECTED
HCOV 229E RNA SPEC QL NAA+PROBE: NOT DETECTED
HCOV HKU1 RNA SPEC QL NAA+PROBE: NOT DETECTED
HCOV NL63 RNA SPEC QL NAA+PROBE: NOT DETECTED
HCOV OC43 RNA SPEC QL NAA+PROBE: NOT DETECTED
HCT VFR BLD AUTO: 41.6 % (ref 34–46.6)
HGB BLD-MCNC: 13.7 G/DL (ref 12–15.9)
HMPV RNA NPH QL NAA+NON-PROBE: NOT DETECTED
HPIV1 RNA SPEC QL NAA+PROBE: NOT DETECTED
HPIV2 RNA SPEC QL NAA+PROBE: NOT DETECTED
HPIV3 RNA NPH QL NAA+PROBE: NOT DETECTED
HPIV4 P GENE NPH QL NAA+PROBE: NOT DETECTED
IMM GRANULOCYTES # BLD AUTO: 0.02 10*3/MM3 (ref 0–0.05)
IMM GRANULOCYTES NFR BLD AUTO: 0.4 % (ref 0–0.5)
LDH SERPL-CCNC: 332 U/L (ref 135–214)
LYMPHOCYTES # BLD AUTO: 0.88 10*3/MM3 (ref 0.7–3.1)
LYMPHOCYTES NFR BLD AUTO: 19.8 % (ref 19.6–45.3)
M PNEUMO IGG SER IA-ACNC: NOT DETECTED
MCH RBC QN AUTO: 29.2 PG (ref 26.6–33)
MCHC RBC AUTO-ENTMCNC: 32.9 G/DL (ref 31.5–35.7)
MCV RBC AUTO: 88.7 FL (ref 79–97)
MONOCYTES # BLD AUTO: 0.6 10*3/MM3 (ref 0.1–0.9)
MONOCYTES NFR BLD AUTO: 13.5 % (ref 5–12)
NEUTROPHILS NFR BLD AUTO: 2.94 10*3/MM3 (ref 1.7–7)
NEUTROPHILS NFR BLD AUTO: 66.1 % (ref 42.7–76)
NRBC BLD AUTO-RTO: 0 /100 WBC (ref 0–0.2)
NT-PROBNP SERPL-MCNC: 110.2 PG/ML (ref 0–900)
PLATELET # BLD AUTO: 195 10*3/MM3 (ref 140–450)
PMV BLD AUTO: 10.7 FL (ref 6–12)
POTASSIUM SERPL-SCNC: 4.1 MMOL/L (ref 3.5–5.2)
PROCALCITONIN SERPL-MCNC: 0.08 NG/ML (ref 0–0.25)
PROT SERPL-MCNC: 7 G/DL (ref 6–8.5)
RBC # BLD AUTO: 4.69 10*6/MM3 (ref 3.77–5.28)
RHINOVIRUS RNA SPEC NAA+PROBE: NOT DETECTED
RSV RNA NPH QL NAA+NON-PROBE: NOT DETECTED
SARS-COV-2 RNA NPH QL NAA+NON-PROBE: DETECTED
SODIUM SERPL-SCNC: 135 MMOL/L (ref 136–145)
TROPONIN T SERPL-MCNC: <0.01 NG/ML (ref 0–0.03)
WBC # BLD AUTO: 4.45 10*3/MM3 (ref 3.4–10.8)
WHOLE BLOOD HOLD SPECIMEN: NORMAL

## 2020-12-01 PROCEDURE — 80053 COMPREHEN METABOLIC PANEL: CPT | Performed by: PHYSICIAN ASSISTANT

## 2020-12-01 PROCEDURE — 93010 ELECTROCARDIOGRAM REPORT: CPT | Performed by: INTERNAL MEDICINE

## 2020-12-01 PROCEDURE — 99283 EMERGENCY DEPT VISIT LOW MDM: CPT

## 2020-12-01 PROCEDURE — 85025 COMPLETE CBC W/AUTO DIFF WBC: CPT | Performed by: PHYSICIAN ASSISTANT

## 2020-12-01 PROCEDURE — 84145 PROCALCITONIN (PCT): CPT | Performed by: PHYSICIAN ASSISTANT

## 2020-12-01 PROCEDURE — 71045 X-RAY EXAM CHEST 1 VIEW: CPT

## 2020-12-01 PROCEDURE — 83615 LACTATE (LD) (LDH) ENZYME: CPT | Performed by: PHYSICIAN ASSISTANT

## 2020-12-01 PROCEDURE — 0202U NFCT DS 22 TRGT SARS-COV-2: CPT | Performed by: PHYSICIAN ASSISTANT

## 2020-12-01 PROCEDURE — 82728 ASSAY OF FERRITIN: CPT | Performed by: PHYSICIAN ASSISTANT

## 2020-12-01 PROCEDURE — 83880 ASSAY OF NATRIURETIC PEPTIDE: CPT | Performed by: PHYSICIAN ASSISTANT

## 2020-12-01 PROCEDURE — 93005 ELECTROCARDIOGRAM TRACING: CPT | Performed by: PHYSICIAN ASSISTANT

## 2020-12-01 PROCEDURE — 86140 C-REACTIVE PROTEIN: CPT | Performed by: PHYSICIAN ASSISTANT

## 2020-12-01 PROCEDURE — 84484 ASSAY OF TROPONIN QUANT: CPT | Performed by: PHYSICIAN ASSISTANT

## 2020-12-01 NOTE — ED PROVIDER NOTES
Subjective   Patient presents to emergency department for mild cough, fatigue, anorexia x 1 week.  States her  is being discharged today from recent admission for COVID-19.  States she tested -6 days ago for Covid.  Denies any fever/chills, chest pain, abdominal pain, nausea/vomiting.      History provided by:  Patient   used: No    Weakness - Generalized  Severity:  Moderate  Onset quality:  Gradual  Duration:  7 days  Timing:  Constant  Associated symptoms: anorexia, cough and fever    Associated symptoms: no chest pain, no dysuria, no nausea, no shortness of breath and no vomiting    Fever  Associated symptoms: cough    Associated symptoms: no chest pain, no chills, no confusion, no dysuria, no nausea, no sore throat and no vomiting        Review of Systems   Constitutional: Positive for appetite change, fatigue and fever. Negative for chills.   HENT: Negative for sore throat and trouble swallowing.    Eyes: Negative for visual disturbance.   Respiratory: Positive for cough. Negative for shortness of breath and wheezing.    Cardiovascular: Negative for chest pain.   Gastrointestinal: Positive for anorexia. Negative for nausea and vomiting.   Genitourinary: Negative for dysuria and flank pain.   Musculoskeletal: Negative for back pain.   Skin: Negative for color change.   Allergic/Immunologic: Negative for immunocompromised state.   Neurological: Negative for syncope and weakness.   Hematological: Does not bruise/bleed easily.   Psychiatric/Behavioral: Negative for confusion.       Past Medical History:   Diagnosis Date   • Acute allergic reaction    • Acute otitis externa    • Acute sinusitis    • Acute urinary tract infection    • Allergic conjunctivitis    • Breast cyst    • Closed fracture of foot    • Cough    • Encounter for immunization    • Exanthematous disorder    • H/O screening mammography 03/12/2015   • Hyperlipidemia    • Kellogg's metatarsalgia    • Pain in lower limb    •  "Pain in throat    • Screening for osteoporosis 03/12/2015   • Upper respiratory infection    • Urinary tract infectious disease    • Viral disease    • Vitamin D deficiency        Allergies   Allergen Reactions   • Erythromycin Base    • Keflex [Cephalexin] Rash   • Lortab [Hydrocodone-Acetaminophen] Rash   • Orudis [Ketoprofen] Rash   • Other Rash     Phenobarbital and Darvocet-N 100   • Sulfa Antibiotics Rash       Past Surgical History:   Procedure Laterality Date   • BREAST CYST ASPIRATION     • CATARACT EXTRACTION     • INJECTION OF MEDICATION  01/17/2016    Dexamethasone   • INJECTION OF MEDICATION  01/17/2016    INJECTION FOR NEUROMA   • INJECTION OF MEDICATION      kenalog x2- preformed on 01/17/2016 and 01/20/2011   • NOSE SURGERY  1997   • WRIST SURGERY Left        Family History   Problem Relation Age of Onset   • Cancer Other    • Diabetes Other    • Hyperlipidemia Other    • Stroke Other    • Vision loss Other    • Hyperlipidemia Mother    • Stroke Mother    • Cancer Father    • Diabetes Maternal Grandmother        Social History     Socioeconomic History   • Marital status:      Spouse name: Not on file   • Number of children: Not on file   • Years of education: Not on file   • Highest education level: Not on file   Tobacco Use   • Smoking status: Never Smoker   • Smokeless tobacco: Never Used   Substance and Sexual Activity   • Alcohol use: No   • Drug use: No   • Sexual activity: Defer           Objective      /74 (BP Location: Left arm, Patient Position: Lying)   Pulse 90   Temp 95.6 °F (35.3 °C) (Axillary)   Resp 18   Ht 172.7 cm (68\")   Wt 73.8 kg (162 lb 11.2 oz)   SpO2 96%   BMI 24.74 kg/m²     Physical Exam  Vitals signs and nursing note reviewed.   Constitutional:       Appearance: Normal appearance.   HENT:      Head: Normocephalic.      Mouth/Throat:      Pharynx: Oropharynx is clear.   Eyes:      Conjunctiva/sclera: Conjunctivae normal.   Cardiovascular:      Rate and " Rhythm: Normal rate and regular rhythm.      Pulses: Normal pulses.      Heart sounds: Normal heart sounds.   Pulmonary:      Effort: Pulmonary effort is normal. No respiratory distress.      Breath sounds: Normal breath sounds. No wheezing.   Abdominal:      Tenderness: There is no abdominal tenderness.   Skin:     Capillary Refill: Capillary refill takes less than 2 seconds.   Neurological:      General: No focal deficit present.      Mental Status: She is alert.   Psychiatric:         Mood and Affect: Mood normal.         Behavior: Behavior normal.         Thought Content: Thought content normal.         ECG 12 Lead      Date/Time: 12/1/2020 1:19 PM  Performed by: Pierce Turcios PA-C  Authorized by: Pierce Turcios PA-C   Interpreted by physician  Comparison: not compared with previous ECG   Previous ECG: no previous ECG available  Rhythm: sinus rhythm  Rate: normal  BPM: 89  ST Segments: ST segments normal  Clinical impression: normal ECG                 ED Course  ED Course as of Dec 01 1355   Tue Dec 01, 2020   1354 Patient positive for COVID-19 on respiratory panel.  Chest x-ray normal.  Patient is well-appearing and is oxygenating well on room air and work-up otherwise unremarkable.  Due to mild symptoms discussed with patient self quarantine at home and return for any worsening of symptoms.  Patient indicates understanding and agrees.    [WING]      ED Course User Index  [WING] Pierce Turcios PA-C      Results for orders placed or performed during the hospital encounter of 12/01/20   Respiratory Panel PCR w/COVID-19(SARS-CoV-2) [facility] In-House, NP Swab in UTM/VTM, 3-4 HR TAT - Swab, Nasopharynx    Specimen: Nasopharynx; Swab   Result Value Ref Range    ADENOVIRUS, PCR Not Detected Not Detected    Coronavirus 229E Not Detected Not Detected    Coronavirus HKU1 Not Detected Not Detected    Coronavirus NL63 Not Detected Not Detected    Coronavirus OC43 Not Detected Not  Detected    COVID19 Detected (C) Not Detected - Ref. Range    Human Metapneumovirus Not Detected Not Detected    Human Rhinovirus/Enterovirus Not Detected Not Detected    Influenza B PCR Not Detected Not Detected    Parainfluenza Virus 1 Not Detected Not Detected    Parainfluenza Virus 2 Not Detected Not Detected    Parainfluenza Virus 3 Not Detected Not Detected    Parainfluenza Virus 4 Not Detected Not Detected    RSV, PCR Not Detected Not Detected    Bordetella pertussis pcr Not Detected Not Detected    Bordetella parapertussis PCR Not Detected Not Detected    Chlamydophila pneumoniae PCR Not Detected Not Detected    Mycoplasma pneumo by PCR Not Detected Not Detected   Comprehensive Metabolic Panel    Specimen: Blood   Result Value Ref Range    Glucose 128 (H) 65 - 99 mg/dL    BUN 13 8 - 23 mg/dL    Creatinine 1.06 (H) 0.57 - 1.00 mg/dL    Sodium 135 (L) 136 - 145 mmol/L    Potassium 4.1 3.5 - 5.2 mmol/L    Chloride 98 98 - 107 mmol/L    CO2 23.0 22.0 - 29.0 mmol/L    Calcium 9.0 8.6 - 10.5 mg/dL    Total Protein 7.0 6.0 - 8.5 g/dL    Albumin 4.20 3.50 - 5.20 g/dL    ALT (SGPT) 29 1 - 33 U/L    AST (SGOT) 49 (H) 1 - 32 U/L    Alkaline Phosphatase 67 39 - 117 U/L    Total Bilirubin 0.3 0.0 - 1.2 mg/dL    eGFR Non African Amer 51 (L) >60 mL/min/1.73    Globulin 2.8 gm/dL    A/G Ratio 1.5 g/dL    BUN/Creatinine Ratio 12.3 7.0 - 25.0    Anion Gap 14.0 5.0 - 15.0 mmol/L   Lactate Dehydrogenase    Specimen: Blood   Result Value Ref Range     (H) 135 - 214 U/L   Procalcitonin    Specimen: Blood   Result Value Ref Range    Procalcitonin 0.08 0.00 - 0.25 ng/mL   C-reactive Protein    Specimen: Blood   Result Value Ref Range    C-Reactive Protein 2.87 (H) 0.00 - 0.50 mg/dL   Ferritin    Specimen: Blood   Result Value Ref Range    Ferritin 675.00 (H) 13.00 - 150.00 ng/mL   CBC Auto Differential    Specimen: Blood   Result Value Ref Range    WBC 4.45 3.40 - 10.80 10*3/mm3    RBC 4.69 3.77 - 5.28 10*6/mm3    Hemoglobin  13.7 12.0 - 15.9 g/dL    Hematocrit 41.6 34.0 - 46.6 %    MCV 88.7 79.0 - 97.0 fL    MCH 29.2 26.6 - 33.0 pg    MCHC 32.9 31.5 - 35.7 g/dL    RDW 13.2 12.3 - 15.4 %    RDW-SD 43.4 37.0 - 54.0 fl    MPV 10.7 6.0 - 12.0 fL    Platelets 195 140 - 450 10*3/mm3    Neutrophil % 66.1 42.7 - 76.0 %    Lymphocyte % 19.8 19.6 - 45.3 %    Monocyte % 13.5 (H) 5.0 - 12.0 %    Eosinophil % 0.0 (L) 0.3 - 6.2 %    Basophil % 0.2 0.0 - 1.5 %    Immature Grans % 0.4 0.0 - 0.5 %    Neutrophils, Absolute 2.94 1.70 - 7.00 10*3/mm3    Lymphocytes, Absolute 0.88 0.70 - 3.10 10*3/mm3    Monocytes, Absolute 0.60 0.10 - 0.90 10*3/mm3    Eosinophils, Absolute 0.00 0.00 - 0.40 10*3/mm3    Basophils, Absolute 0.01 0.00 - 0.20 10*3/mm3    Immature Grans, Absolute 0.02 0.00 - 0.05 10*3/mm3    nRBC 0.0 0.0 - 0.2 /100 WBC   Troponin    Specimen: Blood   Result Value Ref Range    Troponin T <0.010 0.000 - 0.030 ng/mL   BNP    Specimen: Blood   Result Value Ref Range    proBNP 110.2 0.0 - 900.0 pg/mL   Light Blue Top   Result Value Ref Range    Extra Tube hold for add-on      Xr Chest Ap    Result Date: 12/1/2020  Narrative: PROCEDURE: Portable chest x-ray TECHNIQUE: Single frontal view of the chest COMPARISON: None HISTORY: COVID Evaluation, Cough, Fever FINDINGS: The lungs appear clear. Heart, hilar, and mediastinal structures appear normal. No osseous abnormality is seen.     Impression: CONCLUSION: No acute pulmonary disease. Electronically signed by:  Mikie Callahan MD  12/1/2020 12:01 PM CST Workstation: [id number]    Discussed results with patient.  Gave educational materials.  Advised close follow up with PCP.  Return to emergency department for new or worsening symptoms.                                         MDM    Final diagnoses:   COVID-19 virus infection   Fatigue, unspecified type   Anorexia   Cough            Pierce Turcios PA-C  12/01/20 2044

## 2020-12-02 ENCOUNTER — PATIENT OUTREACH (OUTPATIENT)
Dept: CASE MANAGEMENT | Facility: OTHER | Age: 70
End: 2020-12-02

## 2020-12-02 ENCOUNTER — EPISODE CHANGES (OUTPATIENT)
Dept: CASE MANAGEMENT | Facility: OTHER | Age: 70
End: 2020-12-02

## 2020-12-02 LAB
QT INTERVAL: 368 MS
QTC INTERVAL: 447 MS

## 2020-12-02 NOTE — OUTREACH NOTE
"ED Potential Covid Discharge Follow-up    Pt had ED visit 12/1/20 for covid diagnosis. Pt reports feeling \"pretty wiped out\". Had temp last night 101.5. She states took tylenol and fever went down. She reported her  was discharged home yesterday from hospital admission for covid. She reports isolating self in a separate room from . She reports has family outside the home for needs. Encd rest, hydration, deep breathing, tylenol. Discussed f/u with pcp, perhaps via telehealth, pt declined \"not right now\". Reviewed [facility] benefits (active), telehealth, and urgent care virtual appts. Provided 24/7 nurse line and covid hotline.    Catina Garcia RN  Ambulatory     12/2/2020, 13:40 EST      "

## 2021-02-08 ENCOUNTER — TELEPHONE (OUTPATIENT)
Dept: FAMILY MEDICINE CLINIC | Facility: CLINIC | Age: 71
End: 2021-02-08

## 2021-02-08 NOTE — TELEPHONE ENCOUNTER
PATIENT CALLED IN REQUESTING A CALL BACK TO DISCUSS IF THERE IS A 90 DAY WAITING PERIOD BEFORE SHE AND HER  CAN GET THE COVID VACCINE  PATIENT STATES SHE HAD MENTIONED TO HER PROVIDER AND SHE HADNT HEARD ANYTHING BACK YET SO SHE WANTED TO CHECK IN WITH US TO SEE IF WE HAD ANYMORE INFORMATION FOR HER    GOOD CALL BACK   [phone]

## 2021-02-08 NOTE — TELEPHONE ENCOUNTER
Because Dariel received the antibody treatment in the hospital, he will have to wait the 90 days. She does not have to wait, if she wants to be schedule let Trupti know.

## 2021-09-14 DIAGNOSIS — Z12.31 ENCOUNTER FOR SCREENING MAMMOGRAM FOR MALIGNANT NEOPLASM OF BREAST: Primary | ICD-10-CM

## 2021-11-11 ENCOUNTER — TELEPHONE (OUTPATIENT)
Dept: FAMILY MEDICINE CLINIC | Facility: CLINIC | Age: 71
End: 2021-11-11

## 2021-11-11 DIAGNOSIS — E78.2 MIXED HYPERLIPIDEMIA: Primary | ICD-10-CM

## 2021-11-11 DIAGNOSIS — E55.9 VITAMIN D DEFICIENCY: ICD-10-CM

## 2021-11-15 ENCOUNTER — LAB (OUTPATIENT)
Dept: LAB | Facility: HOSPITAL | Age: 71
End: 2021-11-15

## 2021-11-15 DIAGNOSIS — E78.2 MIXED HYPERLIPIDEMIA: ICD-10-CM

## 2021-11-15 LAB
ALBUMIN SERPL-MCNC: 4.6 G/DL (ref 3.5–5.2)
ALBUMIN/GLOB SERPL: 1.6 G/DL
ALP SERPL-CCNC: 64 U/L (ref 39–117)
ALT SERPL W P-5'-P-CCNC: 16 U/L (ref 1–33)
ANION GAP SERPL CALCULATED.3IONS-SCNC: 4.7 MMOL/L (ref 5–15)
AST SERPL-CCNC: 19 U/L (ref 1–32)
BASOPHILS # BLD AUTO: 0.11 10*3/MM3 (ref 0–0.2)
BASOPHILS NFR BLD AUTO: 1.7 % (ref 0–1.5)
BILIRUB SERPL-MCNC: 0.3 MG/DL (ref 0–1.2)
BUN SERPL-MCNC: 14 MG/DL (ref 8–23)
BUN/CREAT SERPL: 15.4 (ref 7–25)
CALCIUM SPEC-SCNC: 9.8 MG/DL (ref 8.6–10.5)
CHLORIDE SERPL-SCNC: 105 MMOL/L (ref 98–107)
CHOLEST SERPL-MCNC: 256 MG/DL (ref 0–200)
CO2 SERPL-SCNC: 28.3 MMOL/L (ref 22–29)
CREAT SERPL-MCNC: 0.91 MG/DL (ref 0.57–1)
DEPRECATED RDW RBC AUTO: 41.7 FL (ref 37–54)
EOSINOPHIL # BLD AUTO: 0.31 10*3/MM3 (ref 0–0.4)
EOSINOPHIL NFR BLD AUTO: 4.7 % (ref 0.3–6.2)
ERYTHROCYTE [DISTWIDTH] IN BLOOD BY AUTOMATED COUNT: 13 % (ref 12.3–15.4)
GFR SERPL CREATININE-BSD FRML MDRD: 61 ML/MIN/1.73
GLOBULIN UR ELPH-MCNC: 2.9 GM/DL
GLUCOSE SERPL-MCNC: 100 MG/DL (ref 65–99)
HCT VFR BLD AUTO: 41.3 % (ref 34–46.6)
HDLC SERPL-MCNC: 58 MG/DL (ref 40–60)
HGB BLD-MCNC: 13.4 G/DL (ref 12–15.9)
IMM GRANULOCYTES # BLD AUTO: 0.02 10*3/MM3 (ref 0–0.05)
IMM GRANULOCYTES NFR BLD AUTO: 0.3 % (ref 0–0.5)
LDLC SERPL CALC-MCNC: 145 MG/DL (ref 0–100)
LDLC/HDLC SERPL: 2.4 {RATIO}
LYMPHOCYTES # BLD AUTO: 2.76 10*3/MM3 (ref 0.7–3.1)
LYMPHOCYTES NFR BLD AUTO: 41.6 % (ref 19.6–45.3)
MCH RBC QN AUTO: 28.7 PG (ref 26.6–33)
MCHC RBC AUTO-ENTMCNC: 32.4 G/DL (ref 31.5–35.7)
MCV RBC AUTO: 88.4 FL (ref 79–97)
MONOCYTES # BLD AUTO: 0.63 10*3/MM3 (ref 0.1–0.9)
MONOCYTES NFR BLD AUTO: 9.5 % (ref 5–12)
NEUTROPHILS NFR BLD AUTO: 2.8 10*3/MM3 (ref 1.7–7)
NEUTROPHILS NFR BLD AUTO: 42.2 % (ref 42.7–76)
NRBC BLD AUTO-RTO: 0 /100 WBC (ref 0–0.2)
PLATELET # BLD AUTO: 311 10*3/MM3 (ref 140–450)
PMV BLD AUTO: 10.5 FL (ref 6–12)
POTASSIUM SERPL-SCNC: 4.7 MMOL/L (ref 3.5–5.2)
PROT SERPL-MCNC: 7.5 G/DL (ref 6–8.5)
RBC # BLD AUTO: 4.67 10*6/MM3 (ref 3.77–5.28)
SODIUM SERPL-SCNC: 138 MMOL/L (ref 136–145)
TRIGL SERPL-MCNC: 293 MG/DL (ref 0–150)
VLDLC SERPL-MCNC: 53 MG/DL (ref 5–40)
WBC # BLD AUTO: 6.63 10*3/MM3 (ref 3.4–10.8)

## 2021-11-15 PROCEDURE — 81001 URINALYSIS AUTO W/SCOPE: CPT

## 2021-11-15 PROCEDURE — 36415 COLL VENOUS BLD VENIPUNCTURE: CPT

## 2021-11-15 PROCEDURE — 80061 LIPID PANEL: CPT

## 2021-11-15 PROCEDURE — 80053 COMPREHEN METABOLIC PANEL: CPT

## 2021-11-15 PROCEDURE — 85025 COMPLETE CBC W/AUTO DIFF WBC: CPT

## 2021-11-16 LAB
BACTERIA UR QL AUTO: NORMAL /HPF
BILIRUB UR QL STRIP: NEGATIVE
CLARITY UR: CLEAR
COLOR UR: YELLOW
GLUCOSE UR STRIP-MCNC: NEGATIVE MG/DL
HGB UR QL STRIP.AUTO: NEGATIVE
HYALINE CASTS UR QL AUTO: NORMAL /LPF
KETONES UR QL STRIP: NEGATIVE
LEUKOCYTE ESTERASE UR QL STRIP.AUTO: ABNORMAL
NITRITE UR QL STRIP: NEGATIVE
PH UR STRIP.AUTO: 6.5 [PH] (ref 5–8)
PROT UR QL STRIP: NEGATIVE
RBC # UR: NORMAL /HPF
REF LAB TEST METHOD: NORMAL
SP GR UR STRIP: 1.02 (ref 1–1.03)
SQUAMOUS #/AREA URNS HPF: NORMAL /HPF
UROBILINOGEN UR QL STRIP: ABNORMAL
WBC UR QL AUTO: NORMAL /HPF

## 2021-11-23 ENCOUNTER — OFFICE VISIT (OUTPATIENT)
Dept: FAMILY MEDICINE CLINIC | Facility: CLINIC | Age: 71
End: 2021-11-23

## 2021-11-23 VITALS
DIASTOLIC BLOOD PRESSURE: 79 MMHG | BODY MASS INDEX: 24.71 KG/M2 | HEART RATE: 81 BPM | OXYGEN SATURATION: 98 % | SYSTOLIC BLOOD PRESSURE: 122 MMHG | HEIGHT: 68 IN | WEIGHT: 163 LBS

## 2021-11-23 DIAGNOSIS — Z00.00 MEDICARE ANNUAL WELLNESS VISIT, SUBSEQUENT: Primary | ICD-10-CM

## 2021-11-23 DIAGNOSIS — Z12.11 SCREENING FOR COLON CANCER: ICD-10-CM

## 2021-11-23 DIAGNOSIS — E78.2 MIXED HYPERLIPIDEMIA: ICD-10-CM

## 2021-11-23 DIAGNOSIS — Z23 NEED FOR INFLUENZA VACCINATION: ICD-10-CM

## 2021-11-23 PROCEDURE — 1126F AMNT PAIN NOTED NONE PRSNT: CPT | Performed by: GENERAL PRACTICE

## 2021-11-23 PROCEDURE — 90662 IIV NO PRSV INCREASED AG IM: CPT | Performed by: GENERAL PRACTICE

## 2021-11-23 PROCEDURE — G0439 PPPS, SUBSEQ VISIT: HCPCS | Performed by: GENERAL PRACTICE

## 2021-11-23 PROCEDURE — G0008 ADMIN INFLUENZA VIRUS VAC: HCPCS | Performed by: GENERAL PRACTICE

## 2021-11-23 PROCEDURE — 1159F MED LIST DOCD IN RCRD: CPT | Performed by: GENERAL PRACTICE

## 2021-11-23 PROCEDURE — 1170F FXNL STATUS ASSESSED: CPT | Performed by: GENERAL PRACTICE

## 2021-11-23 RX ORDER — ROSUVASTATIN CALCIUM 5 MG/1
5 TABLET, COATED ORAL DAILY
Qty: 90 TABLET | Refills: 3 | Status: SHIPPED | OUTPATIENT
Start: 2021-11-23 | End: 2023-03-06 | Stop reason: SDUPTHER

## 2021-11-23 NOTE — PROGRESS NOTES
The ABCs of the Annual Wellness Visit  Subsequent Medicare Wellness Visit    Chief Complaint   Patient presents with   • Medicare Wellness-subsequent   • Annual Exam   • Hyperlipidemia      Subjective    History of Present Illness:  Karey Cazares is a 71 y.o. female who presents for a Subsequent Medicare Wellness Visit. Due for mammogram and bone density.     The following portions of the patient's history were reviewed and   updated as appropriate: allergies, current medications, past family history, past medical history, past social history, past surgical history and problem list.    Compared to one year ago, the patient feels her physical   health is the same.    Compared to one year ago, the patient feels her mental   health is the same.    Recent Hospitalizations:  She was not admitted to the hospital during the last year.       Current Medical Providers:  Patient Care Team:  Tatiana Paredes MD as PCP - Hosea Guillaume OD (Optometry)  Maged Mirza DPM as Consulting Physician (Podiatry)    Outpatient Medications Prior to Visit   Medication Sig Dispense Refill   • aspirin 81 MG chewable tablet Chew 81 mg Daily.     • Calcium-Magnesium-Zinc 333-133-5 MG tablet Take  by mouth.     • MILK THISTLE PO Take  by mouth Daily.     • Multiple Vitamin (MULTI VITAMIN DAILY PO) Take  by mouth Daily.     • Multiple Vitamins-Minerals (EYE HEALTH PO) Take  by mouth Daily.     • Multiple Vitamins-Minerals (HAIR SKIN NAILS PO) Take 1 tablet by mouth Daily.     • TURMERIC PO Take 1,000 mg by mouth Daily.     • vitamin B-12 (CYANOCOBALAMIN) 1000 MCG tablet Take 1,000 mcg by mouth Daily.     • vitamin B-6 (PYRIDOXINE) 50 MG tablet Take 100 mg by mouth Daily.     • vitamin D3 125 MCG (5000 UT) capsule capsule Take 5,000 Units by mouth Daily.     • rosuvastatin (Crestor) 5 MG tablet Take 1 tablet by mouth Daily. 90 tablet 3   • APPLE CIDER VINEGAR PO Take  by mouth.       No facility-administered medications  "prior to visit.       No opioid medication identified on active medication list. I have reviewed chart for other potential  high risk medication/s and harmful drug interactions in the elderly.          Aspirin is on active medication list. Aspirin use is indicated based on review of current medical condition/s. Pros and cons of this therapy have been discussed today. Benefits of this medication outweigh potential harm.  Patient has been encouraged to continue taking this medication.  .      Patient Active Problem List   Diagnosis   • Acute low back pain   • Vertebral compression fracture (HCC)   • Mixed hyperlipidemia   • Depressive disorder     Advance Care Planning  Advance Directive is on file.  ACP discussion was held with the patient during this visit. Patient has an advance directive in EMR which is still valid.           Objective    Vitals:    11/23/21 1259   BP: 122/79   BP Location: Left arm   Pulse: 81   SpO2: 98%   Weight: 73.9 kg (163 lb)   Height: 172.7 cm (68\")   PainSc: 0-No pain     BMI Readings from Last 1 Encounters:   11/23/21 24.78 kg/m²   BMI is within normal parameters. No follow-up required.    Does the patient have evidence of cognitive impairment? No    Physical Exam  Vitals and nursing note reviewed.   Constitutional:       General: She is not in acute distress.     Appearance: She is well-developed.   HENT:      Head: Normocephalic and atraumatic.      Nose: Nose normal.   Eyes:      General:         Right eye: No discharge.         Left eye: No discharge.      Conjunctiva/sclera: Conjunctivae normal.      Pupils: Pupils are equal, round, and reactive to light.   Neck:      Thyroid: No thyromegaly.      Trachea: No tracheal deviation.   Cardiovascular:      Rate and Rhythm: Normal rate and regular rhythm.      Heart sounds: Normal heart sounds. No murmur heard.      Pulmonary:      Effort: Pulmonary effort is normal. No respiratory distress.      Breath sounds: Normal breath sounds. No " wheezing or rales.   Chest:      Chest wall: No tenderness.   Breasts:      Right: No inverted nipple, mass, nipple discharge, skin change or tenderness.      Left: No inverted nipple, mass, nipple discharge, skin change or tenderness.       Abdominal:      General: Bowel sounds are normal. There is no distension.      Palpations: Abdomen is soft. There is no mass.      Tenderness: There is no abdominal tenderness.      Hernia: No hernia is present.   Musculoskeletal:         General: No deformity. Normal range of motion.   Lymphadenopathy:      Cervical: No cervical adenopathy.   Skin:     General: Skin is warm and dry.   Neurological:      Mental Status: She is alert and oriented to person, place, and time.      Deep Tendon Reflexes: Reflexes are normal and symmetric.   Psychiatric:         Behavior: Behavior normal.         Thought Content: Thought content normal.         Judgment: Judgment normal.       Lab Results   Component Value Date    TRIG 293 (H) 11/15/2021    HDL 58 11/15/2021     (H) 11/15/2021    VLDL 53 (H) 11/15/2021        HEALTH RISK ASSESSMENT    Smoking Status:  Social History     Tobacco Use   Smoking Status Never Smoker   Smokeless Tobacco Never Used     Alcohol Consumption:  Social History     Substance and Sexual Activity   Alcohol Use No     Fall Risk Screen:    STEADI Fall Risk Assessment was completed, and patient is at MODERATE risk for falls. Assessment completed on:11/23/2021    Depression Screening:  PHQ-2/PHQ-9 Depression Screening 11/23/2021   Little interest or pleasure in doing things 0   Feeling down, depressed, or hopeless 0   Trouble falling or staying asleep, or sleeping too much 0   Feeling tired or having little energy 0   Poor appetite or overeating 0   Feeling bad about yourself - or that you are a failure or have let yourself or your family down 0   Trouble concentrating on things, such as reading the newspaper or watching television 1   Moving or speaking so slowly  that other people could have noticed. Or the opposite - being so fidgety or restless that you have been moving around a lot more than usual 0   Thoughts that you would be better off dead, or of hurting yourself in some way 0   Total Score 1   If you checked off any problems, how difficult have these problems made it for you to do your work, take care of things at home, or get along with other people? Not difficult at all       Health Habits and Functional and Cognitive Screening:  Functional & Cognitive Status 11/23/2021   Do you have difficulty preparing food and eating? No   Do you have difficulty bathing yourself, getting dressed or grooming yourself? No   Do you have difficulty using the toilet? No   Do you have difficulty moving around from place to place? No   Do you have trouble with steps or getting out of a bed or a chair? No   Current Diet Well Balanced Diet   Dental Exam Not up to date   Eye Exam Up to date   Exercise (times per week) 0 times per week   Current Exercise Activities Include -   Do you need help using the phone?  No   Are you deaf or do you have serious difficulty hearing?  No   Do you need help with transportation? No   Do you need help shopping? No   Do you need help preparing meals?  No   Do you need help with housework?  No   Do you need help with laundry? No   Do you need help taking your medications? No   Do you need help managing money? No   Do you ever drive or ride in a car without wearing a seat belt? No   Have you felt unusual stress, anger or loneliness in the last month? Yes   Who do you live with? Spouse   If you need help, do you have trouble finding someone available to you? No   Have you been bothered in the last four weeks by sexual problems? No   Do you have difficulty concentrating, remembering or making decisions? Yes       Age-appropriate Screening Schedule:  Refer to the list below for future screening recommendations based on patient's age, sex and/or medical  conditions. Orders for these recommended tests are listed in the plan section. The patient has been provided with a written plan.    Health Maintenance   Topic Date Due   • INFLUENZA VACCINE  08/01/2021   • DXA SCAN  09/09/2021   • MAMMOGRAM  10/09/2022   • LIPID PANEL  11/15/2022   • TDAP/TD VACCINES (2 - Td or Tdap) 08/02/2027   • ZOSTER VACCINE  Completed              Assessment/Plan   CMS Preventative Services Quick Reference  Risk Factors Identified During Encounter  Fall Risk-High or Moderate  Immunizations Discussed/Encouraged (specific Immunizations; Influenza and COVID19  The above risks/problems have been discussed with the patient.  Follow up actions/plans if indicated are seen below in the Assessment/Plan Section.  Pertinent information has been shared with the patient in the After Visit Summary.    Diagnoses and all orders for this visit:    1. Medicare annual wellness visit, subsequent (Primary)  -     CBC & Differential; Future  -     Comprehensive Metabolic Panel; Future  -     Lipid Panel; Future  -     Urinalysis With Culture If Indicated -; Future    2. Screening for colon cancer  -     Cologuard - Stool, Per Rectum; Future    3. Mixed hyperlipidemia  -     rosuvastatin (Crestor) 5 MG tablet; Take 1 tablet by mouth Daily.  Dispense: 90 tablet; Refill: 3  -     CBC & Differential; Future  -     Comprehensive Metabolic Panel; Future  -     Lipid Panel; Future  -     Urinalysis With Culture If Indicated -; Future        Follow Up:   Return in about 1 year (around 11/23/2022) for Annual physical, medicare wellness visit.     An After Visit Summary and PPPS were made available to the patient.  Information has been scanned into chart. Discussed importance of taking medications as prescribed. Encouraged healthy eating habits with low fat, low salt choices and working towards maintaining a healthy weight. Recommended regular exercise if able as well as care to prevent falls including avoiding anything on  the floor that they could slip or trip on such as throw rugs, making sure they have a bathmat to step onto when their feet are wet and having grab bars and railings where needed.

## 2021-11-23 NOTE — PATIENT INSTRUCTIONS
Medicare Wellness  Personal Prevention Plan of Service     Date of Office Visit:  2021  Encounter Provider:  Tatiana Paredes MD  Place of Service:  [facility] [place]  Patient Name: Karey Cazares  :  1950    As part of the Medicare Wellness portion of your visit today, we are providing you with this personalized preventive plan of services (PPPS). This plan is based upon recommendations of the [place] Preventive Services Task Force (USPSTF) and the Advisory Committee on Immunization Practices (ACIP).    This lists the preventive care services that should be considered, and provides dates of when you are due. Items listed as completed are up-to-date and do not require any further intervention.    Health Maintenance   Topic Date Due   • INFLUENZA VACCINE  2021   • DXA SCAN  2021   • COVID-19 Vaccine (2 - Moderna 3-dose booster series) 2021 (Originally 3/27/2021)   • MAMMOGRAM  10/09/2022   • LIPID PANEL  11/15/2022   • ANNUAL WELLNESS VISIT  2022   • TDAP/TD VACCINES (2 - Td or Tdap) 2027   • HEPATITIS C SCREENING  Completed   • Pneumococcal Vaccine 65+  Completed   • ZOSTER VACCINE  Completed   • COLORECTAL CANCER SCREENING  Discontinued       Orders Placed This Encounter   Procedures   • Cologuard - Stool, Per Rectum     Standing Status:   Future     Standing Expiration Date:   2022     Order Specific Question:   Release to patient     Answer:   Immediate   • Comprehensive Metabolic Panel     Standing Status:   Future     Standing Expiration Date:   2022     Order Specific Question:   Release to patient     Answer:   Immediate   • Lipid Panel     Standing Status:   Future     Standing Expiration Date:   2022   • Urinalysis With Culture If Indicated -     Standing Status:   Future     Standing Expiration Date:   2022     Order Specific Question:   Release to patient     Answer:    Immediate   • CBC & Differential     Standing Status:   Future     Standing Expiration Date:   12/28/2022     Order Specific Question:   Manual Differential     Answer:   No       Return in about 1 year (around 11/23/2022) for Annual physical, medicare wellness visit.

## 2022-04-14 DIAGNOSIS — R30.0 DYSURIA: Primary | ICD-10-CM

## 2022-06-28 DIAGNOSIS — Z12.31 ENCOUNTER FOR SCREENING MAMMOGRAM FOR BREAST CANCER: Primary | ICD-10-CM

## 2022-11-22 ENCOUNTER — LAB (OUTPATIENT)
Dept: LAB | Facility: HOSPITAL | Age: 72
End: 2022-11-22

## 2022-11-22 DIAGNOSIS — E78.2 MIXED HYPERLIPIDEMIA: ICD-10-CM

## 2022-11-22 DIAGNOSIS — Z00.00 MEDICARE ANNUAL WELLNESS VISIT, SUBSEQUENT: ICD-10-CM

## 2022-11-22 LAB
ALBUMIN SERPL-MCNC: 4.5 G/DL (ref 3.5–5.2)
ALBUMIN/GLOB SERPL: 1.5 G/DL
ALP SERPL-CCNC: 67 U/L (ref 39–117)
ALT SERPL W P-5'-P-CCNC: 17 U/L (ref 1–33)
ANION GAP SERPL CALCULATED.3IONS-SCNC: 7.7 MMOL/L (ref 5–15)
AST SERPL-CCNC: 25 U/L (ref 1–32)
BASOPHILS # BLD AUTO: 0.14 10*3/MM3 (ref 0–0.2)
BASOPHILS NFR BLD AUTO: 1.7 % (ref 0–1.5)
BILIRUB SERPL-MCNC: 0.4 MG/DL (ref 0–1.2)
BUN SERPL-MCNC: 15 MG/DL (ref 8–23)
BUN/CREAT SERPL: 14.4 (ref 7–25)
CALCIUM SPEC-SCNC: 10.1 MG/DL (ref 8.6–10.5)
CHLORIDE SERPL-SCNC: 102 MMOL/L (ref 98–107)
CHOLEST SERPL-MCNC: 192 MG/DL (ref 0–200)
CO2 SERPL-SCNC: 27.3 MMOL/L (ref 22–29)
CREAT SERPL-MCNC: 1.04 MG/DL (ref 0.57–1)
DEPRECATED RDW RBC AUTO: 41.9 FL (ref 37–54)
EGFRCR SERPLBLD CKD-EPI 2021: 57.2 ML/MIN/1.73
EOSINOPHIL # BLD AUTO: 0.26 10*3/MM3 (ref 0–0.4)
EOSINOPHIL NFR BLD AUTO: 3.2 % (ref 0.3–6.2)
ERYTHROCYTE [DISTWIDTH] IN BLOOD BY AUTOMATED COUNT: 13.1 % (ref 12.3–15.4)
GLOBULIN UR ELPH-MCNC: 3.1 GM/DL
GLUCOSE SERPL-MCNC: 106 MG/DL (ref 65–99)
HCT VFR BLD AUTO: 39.1 % (ref 34–46.6)
HDLC SERPL-MCNC: 70 MG/DL (ref 40–60)
HGB BLD-MCNC: 13 G/DL (ref 12–15.9)
IMM GRANULOCYTES # BLD AUTO: 0.02 10*3/MM3 (ref 0–0.05)
IMM GRANULOCYTES NFR BLD AUTO: 0.2 % (ref 0–0.5)
LDLC SERPL CALC-MCNC: 98 MG/DL (ref 0–100)
LDLC/HDLC SERPL: 1.35 {RATIO}
LYMPHOCYTES # BLD AUTO: 2.91 10*3/MM3 (ref 0.7–3.1)
LYMPHOCYTES NFR BLD AUTO: 36.1 % (ref 19.6–45.3)
MCH RBC QN AUTO: 29.2 PG (ref 26.6–33)
MCHC RBC AUTO-ENTMCNC: 33.2 G/DL (ref 31.5–35.7)
MCV RBC AUTO: 87.9 FL (ref 79–97)
MONOCYTES # BLD AUTO: 0.77 10*3/MM3 (ref 0.1–0.9)
MONOCYTES NFR BLD AUTO: 9.5 % (ref 5–12)
NEUTROPHILS NFR BLD AUTO: 3.97 10*3/MM3 (ref 1.7–7)
NEUTROPHILS NFR BLD AUTO: 49.3 % (ref 42.7–76)
NRBC BLD AUTO-RTO: 0 /100 WBC (ref 0–0.2)
PLATELET # BLD AUTO: 317 10*3/MM3 (ref 140–450)
PMV BLD AUTO: 10.7 FL (ref 6–12)
POTASSIUM SERPL-SCNC: 4.6 MMOL/L (ref 3.5–5.2)
PROT SERPL-MCNC: 7.6 G/DL (ref 6–8.5)
RBC # BLD AUTO: 4.45 10*6/MM3 (ref 3.77–5.28)
SODIUM SERPL-SCNC: 137 MMOL/L (ref 136–145)
TRIGL SERPL-MCNC: 138 MG/DL (ref 0–150)
VLDLC SERPL-MCNC: 24 MG/DL (ref 5–40)
WBC NRBC COR # BLD: 8.07 10*3/MM3 (ref 3.4–10.8)

## 2022-11-22 PROCEDURE — 85025 COMPLETE CBC W/AUTO DIFF WBC: CPT

## 2022-11-22 PROCEDURE — 36415 COLL VENOUS BLD VENIPUNCTURE: CPT

## 2022-11-22 PROCEDURE — 81003 URINALYSIS AUTO W/O SCOPE: CPT

## 2022-11-22 PROCEDURE — 80061 LIPID PANEL: CPT

## 2022-11-22 PROCEDURE — 80053 COMPREHEN METABOLIC PANEL: CPT

## 2022-11-23 LAB
BILIRUB UR QL STRIP: NEGATIVE
CLARITY UR: CLEAR
COLOR UR: YELLOW
GLUCOSE UR STRIP-MCNC: NEGATIVE MG/DL
HGB UR QL STRIP.AUTO: NEGATIVE
KETONES UR QL STRIP: NEGATIVE
LEUKOCYTE ESTERASE UR QL STRIP.AUTO: NEGATIVE
NITRITE UR QL STRIP: NEGATIVE
PH UR STRIP.AUTO: 6 [PH] (ref 5–8)
PROT UR QL STRIP: NEGATIVE
SP GR UR STRIP: 1.01 (ref 1–1.03)
UROBILINOGEN UR QL STRIP: NORMAL

## 2022-12-01 ENCOUNTER — OFFICE VISIT (OUTPATIENT)
Dept: FAMILY MEDICINE CLINIC | Facility: CLINIC | Age: 72
End: 2022-12-01

## 2022-12-01 VITALS
OXYGEN SATURATION: 98 % | HEART RATE: 79 BPM | SYSTOLIC BLOOD PRESSURE: 120 MMHG | WEIGHT: 159.4 LBS | HEIGHT: 69 IN | DIASTOLIC BLOOD PRESSURE: 80 MMHG | BODY MASS INDEX: 23.61 KG/M2

## 2022-12-01 DIAGNOSIS — R07.89 OTHER CHEST PAIN: ICD-10-CM

## 2022-12-01 DIAGNOSIS — Z00.00 MEDICARE ANNUAL WELLNESS VISIT, SUBSEQUENT: Primary | ICD-10-CM

## 2022-12-01 PROCEDURE — 1170F FXNL STATUS ASSESSED: CPT | Performed by: GENERAL PRACTICE

## 2022-12-01 PROCEDURE — 1159F MED LIST DOCD IN RCRD: CPT | Performed by: GENERAL PRACTICE

## 2022-12-01 PROCEDURE — 93010 ELECTROCARDIOGRAM REPORT: CPT | Performed by: INTERNAL MEDICINE

## 2022-12-01 PROCEDURE — 1126F AMNT PAIN NOTED NONE PRSNT: CPT | Performed by: GENERAL PRACTICE

## 2022-12-01 PROCEDURE — 93005 ELECTROCARDIOGRAM TRACING: CPT | Performed by: GENERAL PRACTICE

## 2022-12-01 PROCEDURE — G0439 PPPS, SUBSEQ VISIT: HCPCS | Performed by: GENERAL PRACTICE

## 2022-12-01 NOTE — PROGRESS NOTES
The ABCs of the Annual Wellness Visit  Subsequent Medicare Wellness Visit    Chief Complaint   Patient presents with   • Annual Exam     henry   • Medicare Wellness-subsequent      Subjective    History of Present Illness:  Karey Cazares is a 72 y.o. female who presents for a Subsequent Medicare Wellness Visit. Due for mammogram.      The following portions of the patient's history were reviewed and   updated as appropriate: allergies, current medications, past family history, past medical history, past social history, past surgical history and problem list.    Compared to one year ago, the patient feels her physical   health is the same.    Compared to one year ago, the patient feels her mental   health is the same.    Recent Hospitalizations:  She was not admitted to the hospital during the last year.       Current Medical Providers:  Patient Care Team:  Tatiana Paredes MD as PCP - Hosea Guillaume OD (Optometry)  Maged Mirza DPM as Consulting Physician (Podiatry)    Outpatient Medications Prior to Visit   Medication Sig Dispense Refill   • aspirin 81 MG chewable tablet Chew 81 mg Daily.     • Calcium-Magnesium-Zinc 333-133-5 MG tablet Take  by mouth.     • MILK THISTLE PO Take  by mouth Daily.     • Multiple Vitamin (MULTI VITAMIN DAILY PO) Take  by mouth Daily.     • Multiple Vitamins-Minerals (EYE HEALTH PO) Take  by mouth Daily.     • Multiple Vitamins-Minerals (HAIR SKIN NAILS PO) Take 1 tablet by mouth Daily.     • rosuvastatin (Crestor) 5 MG tablet Take 1 tablet by mouth Daily. 90 tablet 3   • vitamin B-12 (CYANOCOBALAMIN) 1000 MCG tablet Take 1,000 mcg by mouth Daily.     • vitamin B-6 (PYRIDOXINE) 50 MG tablet Take 100 mg by mouth Daily.     • vitamin D3 125 MCG (5000 UT) capsule capsule Take 5,000 Units by mouth Daily.     • fluticasone (FLONASE) 50 MCG/ACT nasal spray 2 sprays into the nostril(s) as directed by provider Daily for 30 days. 9 mL 0   • TURMERIC PO Take 1,000 mg by  "mouth Daily.       No facility-administered medications prior to visit.       No opioid medication identified on active medication list. I have reviewed chart for other potential  high risk medication/s and harmful drug interactions in the elderly.          Aspirin is on active medication list. Aspirin use is indicated based on review of current medical condition/s. Pros and cons of this therapy have been discussed today. Benefits of this medication outweigh potential harm.  Patient has been encouraged to continue taking this medication.  .      Patient Active Problem List   Diagnosis   • Acute low back pain   • Vertebral compression fracture (HCC)   • Mixed hyperlipidemia   • Depressive disorder     Advance Care Planning  Advance Directive is on file.  ACP discussion was held with the patient during this visit. Patient has an advance directive in EMR which is still valid.       Objective    Vitals:    12/01/22 1408   BP: 120/80   Pulse: 79   SpO2: 98%   Weight: 72.3 kg (159 lb 6.4 oz)   Height: 175.3 cm (69\")   PainSc: 0-No pain     Estimated body mass index is 23.54 kg/m² as calculated from the following:    Height as of this encounter: 175.3 cm (69\").    Weight as of this encounter: 72.3 kg (159 lb 6.4 oz).    BMI is within normal parameters. No other follow-up for BMI required.      Does the patient have evidence of cognitive impairment? No    Physical Exam  Vitals and nursing note reviewed.   Constitutional:       General: She is not in acute distress.     Appearance: She is well-developed.   HENT:      Head: Normocephalic and atraumatic.      Nose: Nose normal.   Eyes:      General:         Right eye: No discharge.         Left eye: No discharge.      Conjunctiva/sclera: Conjunctivae normal.      Pupils: Pupils are equal, round, and reactive to light.   Neck:      Thyroid: No thyromegaly.      Trachea: No tracheal deviation.   Cardiovascular:      Rate and Rhythm: Normal rate and regular rhythm.      Heart " sounds: Normal heart sounds. No murmur heard.  Pulmonary:      Effort: Pulmonary effort is normal. No respiratory distress.      Breath sounds: Normal breath sounds. No wheezing or rales.   Chest:      Chest wall: No tenderness.   Breasts:     Right: No inverted nipple, mass, nipple discharge, skin change or tenderness.      Left: No inverted nipple, mass, nipple discharge, skin change or tenderness.   Abdominal:      General: Bowel sounds are normal. There is no distension.      Palpations: Abdomen is soft. There is no mass.      Tenderness: There is no abdominal tenderness.      Hernia: No hernia is present.   Musculoskeletal:         General: No deformity. Normal range of motion.   Lymphadenopathy:      Cervical: No cervical adenopathy.   Skin:     General: Skin is warm and dry.   Neurological:      Mental Status: She is alert and oriented to person, place, and time.      Deep Tendon Reflexes: Reflexes are normal and symmetric.   Psychiatric:         Behavior: Behavior normal.         Thought Content: Thought content normal.         Judgment: Judgment normal.       Lab Results   Component Value Date    TRIG 138 11/22/2022    HDL 70 (H) 11/22/2022    LDL 98 11/22/2022    VLDL 24 11/22/2022        HEALTH RISK ASSESSMENT    Smoking Status:  Social History     Tobacco Use   Smoking Status Never   Smokeless Tobacco Never     Alcohol Consumption:  Social History     Substance and Sexual Activity   Alcohol Use No     Fall Risk Screen:    STEADI Fall Risk Assessment was completed, and patient is at MODERATE risk for falls. Assessment completed on:12/1/2022    Depression Screening:  PHQ-2/PHQ-9 Depression Screening 12/1/2022   Retired PHQ-9 Total Score -   Retired Total Score -   Little Interest or Pleasure in Doing Things 1-->several days   Feeling Down, Depressed or Hopeless 1-->several days   Trouble Falling or Staying Asleep, or Sleeping Too Much 0-->not at all   Feeling Tired or Having Little Energy 0-->not at all    Poor Appetite or Overeating 0-->not at all   Feeling Bad about Yourself - or that You are a Failure or Have Let Yourself or Your Family Down 0-->not at all   Trouble Concentrating on Things, Such as Reading the Newspaper or Watching Television 0-->not at all   Moving or Speaking So Slowly that Other People Could Have Noticed? Or the Opposite - Being So Fidgety 0-->not at all   Thoughts that You Would be Better Off Dead or of Hurting Yourself in Some Way 0-->not at all   PHQ-9: Brief Depression Severity Measure Score 2   If You Checked Off Any Problems, How Difficult Have These Problems Made It For You to Do Your Work, Take Care of Things at Home, or Get Along with Other People? not difficult at all       Health Habits and Functional and Cognitive Screening:  Functional & Cognitive Status 12/1/2022   Do you have difficulty preparing food and eating? No   Do you have difficulty bathing yourself, getting dressed or grooming yourself? No   Do you have difficulty using the toilet? No   Do you have difficulty moving around from place to place? No   Do you have trouble with steps or getting out of a bed or a chair? No   Current Diet Well Balanced Diet   Dental Exam Not up to date   Eye Exam Up to date   Exercise (times per week) 0 times per week   Current Exercises Include No Regular Exercise   Current Exercise Activities Include -   Do you need help using the phone?  No   Are you deaf or do you have serious difficulty hearing?  No   Do you need help with transportation? No   Do you need help shopping? No   Do you need help preparing meals?  No   Do you need help with housework?  No   Do you need help with laundry? No   Do you need help taking your medications? No   Do you need help managing money? No   Do you ever drive or ride in a car without wearing a seat belt? No   Have you felt unusual stress, anger or loneliness in the last month? Yes   Who do you live with? Spouse   If you need help, do you have trouble finding  someone available to you? No   Have you been bothered in the last four weeks by sexual problems? No   Do you have difficulty concentrating, remembering or making decisions? No       Age-appropriate Screening Schedule:  Refer to the list below for future screening recommendations based on patient's age, sex and/or medical conditions. Orders for these recommended tests are listed in the plan section. The patient has been provided with a written plan.    Health Maintenance   Topic Date Due   • LIPID PANEL  11/22/2023   • DXA SCAN  11/23/2023   • MAMMOGRAM  12/09/2024   • TDAP/TD VACCINES (2 - Td or Tdap) 08/02/2027   • INFLUENZA VACCINE  Completed   • ZOSTER VACCINE  Completed              Assessment & Plan   CMS Preventative Services Quick Reference  Risk Factors Identified During Encounter  Fall Risk-High or Moderate: Discussed Fall Prevention in the home  Immunizations Discussed/Encouraged: COVID19  The above risks/problems have been discussed with the patient.  Follow up actions/plans if indicated are seen below in the Assessment/Plan Section.  Pertinent information has been shared with the patient in the After Visit Summary.    Diagnoses and all orders for this visit:    1. Medicare annual wellness visit, subsequent (Primary)  -     CBC & Differential; Future  -     Comprehensive Metabolic Panel; Future  -     Lipid Panel; Future  -     Urinalysis With Culture If Indicated - Urine, Clean Catch; Future    2. Other chest pain  -     ECG 12 Lead        Follow Up:   Return in about 1 year (around 12/1/2023) for medicare wellness visit, Annual physical.     An After Visit Summary and PPPS were made available to the patient.  Information has been scanned into chart. Discussed importance of taking medications as prescribed. Encouraged healthy eating habits with low fat, low salt choices and working towards maintaining a healthy weight. Recommended regular exercise if able as well as care to prevent falls including  avoiding anything on the floor that they could slip or trip on such as throw rugs, making sure they have a bathmat to step onto when their feet are wet and having grab bars and railings where needed.

## 2022-12-01 NOTE — PATIENT INSTRUCTIONS
Medicare Wellness  Personal Prevention Plan of Service     Date of Office Visit:    Encounter Provider:  Tatiana Paredes MD  Place of Service:  [facility]  Patient Name: Karey Cazares  :  1950    As part of the Medicare Wellness portion of your visit today, we are providing you with this personalized preventive plan of services (PPPS). This plan is based upon recommendations of the [place] Preventive Services Task Force (USPSTF) and the Advisory Committee on Immunization Practices (ACIP).    This lists the preventive care services that should be considered, and provides dates of when you are due. Items listed as completed are up-to-date and do not require any further intervention.    Health Maintenance   Topic Date Due    COVID-19 Vaccine (2 - Moderna series) 2021    ANNUAL WELLNESS VISIT  2022    LIPID PANEL  2023    MAMMOGRAM  2023    DXA SCAN  2023    TDAP/TD VACCINES (2 - Td or Tdap) 2027    HEPATITIS C SCREENING  Completed    INFLUENZA VACCINE  Completed    Pneumococcal Vaccine 65+  Completed    ZOSTER VACCINE  Completed    COLORECTAL CANCER SCREENING  Discontinued       No orders of the defined types were placed in this encounter.      No follow-ups on file.

## 2022-12-02 ENCOUNTER — TELEPHONE (OUTPATIENT)
Dept: FAMILY MEDICINE CLINIC | Facility: CLINIC | Age: 72
End: 2022-12-02

## 2022-12-02 LAB
QT INTERVAL: 390 MS
QTC INTERVAL: 427 MS

## 2022-12-02 NOTE — TELEPHONE ENCOUNTER
PT WOULD LIKE RESULTS OF HER SCREENING MAMMOGRAM THAT WAS PERFORMED YESTERDAY. SHE HAS BEEN SCHEDULED FOR A DIAG MAMMO AND US ON 12/9 AND DOESN'T UNDERSTAND WHY

## 2022-12-12 ENCOUNTER — OFFICE VISIT (OUTPATIENT)
Dept: SURGERY | Facility: CLINIC | Age: 72
End: 2022-12-12

## 2022-12-12 VITALS
HEIGHT: 69 IN | TEMPERATURE: 97.4 F | SYSTOLIC BLOOD PRESSURE: 119 MMHG | WEIGHT: 162 LBS | HEART RATE: 83 BPM | BODY MASS INDEX: 23.99 KG/M2 | OXYGEN SATURATION: 98 % | DIASTOLIC BLOOD PRESSURE: 94 MMHG

## 2022-12-12 DIAGNOSIS — R92.8 ABNORMAL MAMMOGRAM OF LEFT BREAST: Primary | ICD-10-CM

## 2022-12-12 PROCEDURE — 99204 OFFICE O/P NEW MOD 45 MIN: CPT | Performed by: SURGERY

## 2022-12-12 RX ORDER — LIDOCAINE HYDROCHLORIDE AND EPINEPHRINE 10; 10 MG/ML; UG/ML
20 INJECTION, SOLUTION INFILTRATION; PERINEURAL ONCE
Status: CANCELLED | OUTPATIENT
Start: 2022-12-12 | End: 2022-12-12

## 2022-12-12 RX ORDER — DIAZEPAM 5 MG/1
5 TABLET ORAL ONCE
Status: CANCELLED | OUTPATIENT
Start: 2022-12-12 | End: 2022-12-12

## 2022-12-12 NOTE — H&P (VIEW-ONLY)
Chief Complaint   Patient presents with   • Advice Only     Abnormal mammogram - CAT4        72-year-old female who was found to have an abnormality on screening mammography.  She underwent diagnostic mammogram and ultrasound and was found to have abnormal calcifications on the left.  She denies any changes in her breasts such as erythema, nipple discharge, or nipple retraction.  Her age of menarche was approximately 13.  Her first live birth was 17 and she did breast-feed.  Her age of menopause was 53 and she denies hormone replacement therapy.  She has no history of chest wall radiation and 1 previous breast biopsy that was benign.  She has no family history of breast cancer.      Past Medical History:   Diagnosis Date   • Acute allergic reaction    • Acute otitis externa    • Acute sinusitis    • Acute urinary tract infection    • Allergic conjunctivitis    • Breast cyst    • Closed fracture of foot    • Cough    • Encounter for immunization    • Exanthematous disorder    • H/O screening mammography 03/12/2015   • Hyperlipidemia    • Kellogg's metatarsalgia    • Pain in lower limb    • Pain in throat    • Screening for osteoporosis 03/12/2015   • Upper respiratory infection    • Urinary tract infectious disease    • Viral disease    • Vitamin D deficiency        Past Surgical History:   Procedure Laterality Date   • BREAST BIOPSY     • BREAST CYST ASPIRATION     • CATARACT EXTRACTION     • INJECTION OF MEDICATION  01/17/2016    Dexamethasone   • INJECTION OF MEDICATION  01/17/2016    INJECTION FOR NEUROMA   • INJECTION OF MEDICATION      kenalog x2- preformed on 01/17/2016 and 01/20/2011   • NOSE SURGERY  1997   • WRIST SURGERY Left          Current Outpatient Medications:   •  aspirin 81 MG chewable tablet, Chew 81 mg Daily., Disp: , Rfl:   •  Calcium-Magnesium-Zinc 333-133-5 MG tablet, Take  by mouth., Disp: , Rfl:   •  MILK THISTLE PO, Take  by mouth Daily., Disp: , Rfl:   •  Multiple Vitamin (MULTI VITAMIN  DAILY PO), Take  by mouth Daily., Disp: , Rfl:   •  Multiple Vitamins-Minerals (EYE HEALTH PO), Take  by mouth Daily., Disp: , Rfl:   •  Multiple Vitamins-Minerals (HAIR SKIN NAILS PO), Take 1 tablet by mouth Daily., Disp: , Rfl:   •  rosuvastatin (Crestor) 5 MG tablet, Take 1 tablet by mouth Daily., Disp: 90 tablet, Rfl: 3  •  vitamin B-12 (CYANOCOBALAMIN) 1000 MCG tablet, Take 1,000 mcg by mouth Daily., Disp: , Rfl:   •  vitamin B-6 (PYRIDOXINE) 50 MG tablet, Take 100 mg by mouth Daily., Disp: , Rfl:   •  vitamin D3 125 MCG (5000 UT) capsule capsule, Take 5,000 Units by mouth Daily., Disp: , Rfl:   •  fluticasone (FLONASE) 50 MCG/ACT nasal spray, 2 sprays into the nostril(s) as directed by provider Daily for 30 days., Disp: 9 mL, Rfl: 0    Allergies   Allergen Reactions   • Erythromycin Base    • Keflex [Cephalexin] Rash   • Lortab [Hydrocodone-Acetaminophen] Rash   • Orudis [Ketoprofen] Rash   • Other Rash     Phenobarbital and Darvocet-N 100   • Sulfa Antibiotics Rash       Family History   Problem Relation Age of Onset   • Cancer Other    • Diabetes Other    • Hyperlipidemia Other    • Stroke Other    • Vision loss Other    • Hyperlipidemia Mother    • Stroke Mother    • Cancer Father    • Diabetes Maternal Grandmother        Social History     Socioeconomic History   • Marital status:    Tobacco Use   • Smoking status: Never   • Smokeless tobacco: Never   Substance and Sexual Activity   • Alcohol use: No   • Drug use: No   • Sexual activity: Defer       Review of Systems   Constitutional: Negative.    HENT: Negative.    Eyes: Negative.    Respiratory: Negative.    Cardiovascular: Negative.    Gastrointestinal: Negative.    Endocrine: Positive for heat intolerance.   Genitourinary: Negative.    Musculoskeletal: Negative.    Skin: Negative.    Allergic/Immunologic: Negative.    Neurological: Negative.    Hematological: Negative.    Psychiatric/Behavioral: Negative.        Vitals:    12/12/22 1117   BP:  119/94   Pulse: 83   Temp: 97.4 °F (36.3 °C)   SpO2: 98%       Physical Exam  Constitutional:       Appearance: Normal appearance.   HENT:      Head: Normocephalic and atraumatic.   Cardiovascular:      Rate and Rhythm: Normal rate and regular rhythm.   Pulmonary:      Effort: Pulmonary effort is normal. No respiratory distress.   Chest:   Breasts:     Right: No inverted nipple, mass, nipple discharge or skin change.      Left: No inverted nipple, mass, nipple discharge or skin change.   Lymphadenopathy:      Upper Body:      Right upper body: No supraclavicular or axillary adenopathy.      Left upper body: No supraclavicular or axillary adenopathy.   Neurological:      General: No focal deficit present.      Mental Status: She is alert and oriented to person, place, and time.   Psychiatric:         Mood and Affect: Mood normal.         Behavior: Behavior normal.         PROCEDURE: US BREAST LIMITED UNILATERAL, MAMMO BREAST DIAGNOSTIC  TOMOSYNTHESIS BILATERAL     HISTORY: abnormal mammogram, R92.8 Other abnormal and  inconclusive findings on diagnostic imaging of breast     COMPARISON: Previous exams dated 12/1/2022 through 3/12/2015     Computer-aided detection was utilized during this exam.   Digital breast tomosynthesis was performed.     FINDINGS:   Magnification and true lateral views were obtained of the left  breast. Spot compression and true lateral views were obtained of  the right breast.     Calcifications noted in the upper left breast, 11.8 cm from the  nipple in a linear configuration are mildly suspicious. Recommend  biopsy.     The asymmetric density seen in the right breast effaces with spot  compression.     Ultrasound performed of the right breast in the 9:00 to the 12:00  axis shows a few small benign-appearing dilated ducts but no  suspicious abnormality.     IMPRESSION:  CONCLUSION:    Mildly suspicious calcifications in the upper left breast, 11.8  cm from the nipple.      Recommend  biopsy.     BI-RADS Category 4: Suspicious abnormality.  Biopsy should be  considered.      Findings and recommendations discussed with ION GARRETT on  12/9/2022 11:23 AM CST.     Electronically signed by:  Mikie Callahan MD  12/9/2022 11:25 AM  ASSESSMENT    Diagnoses and all orders for this visit:    1. Abnormal mammogram of left breast (Primary)  -     Mammo Stereotactic Breast Biopsy Initial With & Without Device Left  -     diazePAM (VALIUM) tablet 5 mg  -     lidocaine 1% - EPINEPHrine 1:[id number] (XYLOCAINE W/EPI) 1 %-1:[id number] injection 20 mL    Other orders  -     Obtain Informed Consent; Standing  -     TISSUE EXAM, [facility] ([facility]); Standing        PLAN    1.  I counseled the patient on left breast biopsy.  We discussed the risks of procedure including bleeding and infection.  She expressed understanding wishes to proceed with the procedure.  I will see her back in approximately 1 week after to discuss the results and any further treatment.              This document has been electronically signed by Celestina Kern MA on December 12, 2022 11:27 CST

## 2022-12-12 NOTE — PROGRESS NOTES
Chief Complaint   Patient presents with   • Advice Only     Abnormal mammogram - CAT4        72-year-old female who was found to have an abnormality on screening mammography.  She underwent diagnostic mammogram and ultrasound and was found to have abnormal calcifications on the left.  She denies any changes in her breasts such as erythema, nipple discharge, or nipple retraction.  Her age of menarche was approximately 13.  Her first live birth was 17 and she did breast-feed.  Her age of menopause was 53 and she denies hormone replacement therapy.  She has no history of chest wall radiation and 1 previous breast biopsy that was benign.  She has no family history of breast cancer.      Past Medical History:   Diagnosis Date   • Acute allergic reaction    • Acute otitis externa    • Acute sinusitis    • Acute urinary tract infection    • Allergic conjunctivitis    • Breast cyst    • Closed fracture of foot    • Cough    • Encounter for immunization    • Exanthematous disorder    • H/O screening mammography 03/12/2015   • Hyperlipidemia    • Kellogg's metatarsalgia    • Pain in lower limb    • Pain in throat    • Screening for osteoporosis 03/12/2015   • Upper respiratory infection    • Urinary tract infectious disease    • Viral disease    • Vitamin D deficiency        Past Surgical History:   Procedure Laterality Date   • BREAST BIOPSY     • BREAST CYST ASPIRATION     • CATARACT EXTRACTION     • INJECTION OF MEDICATION  01/17/2016    Dexamethasone   • INJECTION OF MEDICATION  01/17/2016    INJECTION FOR NEUROMA   • INJECTION OF MEDICATION      kenalog x2- preformed on 01/17/2016 and 01/20/2011   • NOSE SURGERY  1997   • WRIST SURGERY Left          Current Outpatient Medications:   •  aspirin 81 MG chewable tablet, Chew 81 mg Daily., Disp: , Rfl:   •  Calcium-Magnesium-Zinc 333-133-5 MG tablet, Take  by mouth., Disp: , Rfl:   •  MILK THISTLE PO, Take  by mouth Daily., Disp: , Rfl:   •  Multiple Vitamin (MULTI VITAMIN  DAILY PO), Take  by mouth Daily., Disp: , Rfl:   •  Multiple Vitamins-Minerals (EYE HEALTH PO), Take  by mouth Daily., Disp: , Rfl:   •  Multiple Vitamins-Minerals (HAIR SKIN NAILS PO), Take 1 tablet by mouth Daily., Disp: , Rfl:   •  rosuvastatin (Crestor) 5 MG tablet, Take 1 tablet by mouth Daily., Disp: 90 tablet, Rfl: 3  •  vitamin B-12 (CYANOCOBALAMIN) 1000 MCG tablet, Take 1,000 mcg by mouth Daily., Disp: , Rfl:   •  vitamin B-6 (PYRIDOXINE) 50 MG tablet, Take 100 mg by mouth Daily., Disp: , Rfl:   •  vitamin D3 125 MCG (5000 UT) capsule capsule, Take 5,000 Units by mouth Daily., Disp: , Rfl:   •  fluticasone (FLONASE) 50 MCG/ACT nasal spray, 2 sprays into the nostril(s) as directed by provider Daily for 30 days., Disp: 9 mL, Rfl: 0    Allergies   Allergen Reactions   • Erythromycin Base    • Keflex [Cephalexin] Rash   • Lortab [Hydrocodone-Acetaminophen] Rash   • Orudis [Ketoprofen] Rash   • Other Rash     Phenobarbital and Darvocet-N 100   • Sulfa Antibiotics Rash       Family History   Problem Relation Age of Onset   • Cancer Other    • Diabetes Other    • Hyperlipidemia Other    • Stroke Other    • Vision loss Other    • Hyperlipidemia Mother    • Stroke Mother    • Cancer Father    • Diabetes Maternal Grandmother        Social History     Socioeconomic History   • Marital status:    Tobacco Use   • Smoking status: Never   • Smokeless tobacco: Never   Substance and Sexual Activity   • Alcohol use: No   • Drug use: No   • Sexual activity: Defer       Review of Systems   Constitutional: Negative.    HENT: Negative.    Eyes: Negative.    Respiratory: Negative.    Cardiovascular: Negative.    Gastrointestinal: Negative.    Endocrine: Positive for heat intolerance.   Genitourinary: Negative.    Musculoskeletal: Negative.    Skin: Negative.    Allergic/Immunologic: Negative.    Neurological: Negative.    Hematological: Negative.    Psychiatric/Behavioral: Negative.        Vitals:    12/12/22 1117   BP:  119/94   Pulse: 83   Temp: 97.4 °F (36.3 °C)   SpO2: 98%       Physical Exam  Constitutional:       Appearance: Normal appearance.   HENT:      Head: Normocephalic and atraumatic.   Cardiovascular:      Rate and Rhythm: Normal rate and regular rhythm.   Pulmonary:      Effort: Pulmonary effort is normal. No respiratory distress.   Chest:   Breasts:     Right: No inverted nipple, mass, nipple discharge or skin change.      Left: No inverted nipple, mass, nipple discharge or skin change.   Lymphadenopathy:      Upper Body:      Right upper body: No supraclavicular or axillary adenopathy.      Left upper body: No supraclavicular or axillary adenopathy.   Neurological:      General: No focal deficit present.      Mental Status: She is alert and oriented to person, place, and time.   Psychiatric:         Mood and Affect: Mood normal.         Behavior: Behavior normal.         PROCEDURE: US BREAST LIMITED UNILATERAL, MAMMO BREAST DIAGNOSTIC  TOMOSYNTHESIS BILATERAL     HISTORY: abnormal mammogram, R92.8 Other abnormal and  inconclusive findings on diagnostic imaging of breast     COMPARISON: Previous exams dated 12/1/2022 through 3/12/2015     Computer-aided detection was utilized during this exam.   Digital breast tomosynthesis was performed.     FINDINGS:   Magnification and true lateral views were obtained of the left  breast. Spot compression and true lateral views were obtained of  the right breast.     Calcifications noted in the upper left breast, 11.8 cm from the  nipple in a linear configuration are mildly suspicious. Recommend  biopsy.     The asymmetric density seen in the right breast effaces with spot  compression.     Ultrasound performed of the right breast in the 9:00 to the 12:00  axis shows a few small benign-appearing dilated ducts but no  suspicious abnormality.     IMPRESSION:  CONCLUSION:    Mildly suspicious calcifications in the upper left breast, 11.8  cm from the nipple.      Recommend  biopsy.     BI-RADS Category 4: Suspicious abnormality.  Biopsy should be  considered.      Findings and recommendations discussed with ION GARRETT on  12/9/2022 11:23 AM CST.     Electronically signed by:  Mikie Callahan MD  12/9/2022 11:25 AM  ASSESSMENT    Diagnoses and all orders for this visit:    1. Abnormal mammogram of left breast (Primary)  -     Mammo Stereotactic Breast Biopsy Initial With & Without Device Left  -     diazePAM (VALIUM) tablet 5 mg  -     lidocaine 1% - EPINEPHrine 1:590579 (XYLOCAINE W/EPI) 1 %-1:333303 injection 20 mL    Other orders  -     Obtain Informed Consent; Standing  -     TISSUE EXAM, [facility] ([facility]); Standing        PLAN    1.  I counseled the patient on left breast biopsy.  We discussed the risks of procedure including bleeding and infection.  She expressed understanding wishes to proceed with the procedure.  I will see her back in approximately 1 week after to discuss the results and any further treatment.              This document has been electronically signed by Celestina Kern MA on December 12, 2022 11:27 CST

## 2022-12-16 ENCOUNTER — HOSPITAL ENCOUNTER (OUTPATIENT)
Dept: MAMMOGRAPHY | Facility: HOSPITAL | Age: 72
Discharge: HOME | End: 2022-12-16
Admitting: SURGERY

## 2022-12-16 VITALS
BODY MASS INDEX: 26.08 KG/M2 | WEIGHT: 162.26 LBS | DIASTOLIC BLOOD PRESSURE: 67 MMHG | HEIGHT: 66 IN | TEMPERATURE: 97.3 F | SYSTOLIC BLOOD PRESSURE: 124 MMHG | OXYGEN SATURATION: 97 % | RESPIRATION RATE: 16 BRPM | HEART RATE: 71 BPM

## 2022-12-16 DIAGNOSIS — R92.8 ABNORMAL MAMMOGRAM OF LEFT BREAST: ICD-10-CM

## 2022-12-16 PROCEDURE — A4648 IMPLANTABLE TISSUE MARKER: HCPCS

## 2022-12-16 PROCEDURE — 19081 BX BREAST 1ST LESION STRTCTC: CPT | Performed by: SURGERY

## 2022-12-16 PROCEDURE — 88305 TISSUE EXAM BY PATHOLOGIST: CPT

## 2022-12-16 PROCEDURE — 63710000001 DIAZEPAM 5 MG TABLET: Performed by: SURGERY

## 2022-12-16 PROCEDURE — A9270 NON-COVERED ITEM OR SERVICE: HCPCS | Performed by: SURGERY

## 2022-12-16 RX ORDER — DIAZEPAM 5 MG/1
5 TABLET ORAL ONCE
Status: COMPLETED | OUTPATIENT
Start: 2022-12-16 | End: 2022-12-16

## 2022-12-16 RX ORDER — LIDOCAINE HYDROCHLORIDE AND EPINEPHRINE 10; 10 MG/ML; UG/ML
20 INJECTION, SOLUTION INFILTRATION; PERINEURAL ONCE
Status: COMPLETED | OUTPATIENT
Start: 2022-12-16 | End: 2022-12-16

## 2022-12-16 RX ADMIN — LIDOCAINE HYDROCHLORIDE AND EPINEPHRINE 20 ML: 10; 10 INJECTION, SOLUTION INFILTRATION; PERINEURAL at 07:40

## 2022-12-16 RX ADMIN — DIAZEPAM 5 MG: 5 TABLET ORAL at 06:03

## 2022-12-16 NOTE — DISCHARGE INSTRUCTIONS
Minor Surgery  Outpatient Instructions    General Information  You have had a minor surgical procedure and are not expected to require extensive treatment or a long recovery period.  However, the following information/instructions that are listed serve as guidelines to help you recover at home.    Activity:  *** Do not drive today, avoid strenuous activity today; resume normal activity tomorrow    Hygiene:  *** May shower tomorrow; wear a supportive bra for 48 hours    Dressing/Wound Care:  *** may remove dressing tomorrow    Diet:  *** as tolerated    Important Points:  -Call your doctor to report any of the following:   -unusual or excessive bleeding/drainage   -pain not reduced/controlled by medication   -elevated temperature consistently greater that 100 degrees F   -increased swelling, redness, bruising, or tenderness in surgical area  -Take medications as prescribed by your physician  -If you have any questions, please contact your doctor or the Same Day Surgery Unit at   [phone]  -If a post-operative emergency occurs, report to your nearest ER

## 2022-12-16 NOTE — INTERVAL H&P NOTE
Vitals:    12/16/22 0556   BP: 124/61   Pulse: 82   Resp: 18   Temp: 97.9 °F (36.6 °C)   SpO2: 97%       H&P reviewed. The patient was examined and there are no changes to the H&P.

## 2022-12-17 LAB — REF LAB TEST METHOD: NORMAL

## 2022-12-21 ENCOUNTER — TELEPHONE (OUTPATIENT)
Dept: SURGERY | Facility: CLINIC | Age: 72
End: 2022-12-21

## 2022-12-21 NOTE — TELEPHONE ENCOUNTER
MS HAS SEEN HER RESULTS ON MYCHART.     SHE IS ASKING IF SHE STILL NEEDS TO COME IN FOR HER FOLLOW UP.     HER PHONE [phone]

## 2022-12-28 ENCOUNTER — OFFICE VISIT (OUTPATIENT)
Dept: SURGERY | Facility: CLINIC | Age: 72
End: 2022-12-28

## 2022-12-28 VITALS
SYSTOLIC BLOOD PRESSURE: 100 MMHG | WEIGHT: 162.8 LBS | TEMPERATURE: 97.4 F | HEART RATE: 91 BPM | DIASTOLIC BLOOD PRESSURE: 70 MMHG | HEIGHT: 66 IN | BODY MASS INDEX: 26.16 KG/M2 | OXYGEN SATURATION: 98 %

## 2022-12-28 DIAGNOSIS — R92.8 ABNORMAL MAMMOGRAM OF LEFT BREAST: Primary | ICD-10-CM

## 2022-12-28 PROCEDURE — 99213 OFFICE O/P EST LOW 20 MIN: CPT | Performed by: SURGERY

## 2022-12-28 NOTE — PROGRESS NOTES
Chief Complaint   Patient presents with   • Follow-up     12/12/22 stereotactic results        72-year-old female here to follow-up for left breast stereotactic biopsy.  She has had no issues with the biopsy site.      Past Medical History:   Diagnosis Date   • Acute allergic reaction    • Acute otitis externa    • Acute sinusitis    • Acute urinary tract infection    • Allergic conjunctivitis    • Breast cyst    • Closed fracture of foot    • Cough    • Encounter for immunization    • Exanthematous disorder    • H/O screening mammography 03/12/2015   • Hyperlipidemia    • Kellogg's metatarsalgia    • Pain in lower limb    • Pain in throat    • Screening for osteoporosis 03/12/2015   • Upper respiratory infection    • Urinary tract infectious disease    • Viral disease    • Vitamin D deficiency        Past Surgical History:   Procedure Laterality Date   • BREAST BIOPSY     • BREAST CYST ASPIRATION     • CATARACT EXTRACTION     • INJECTION OF MEDICATION  01/17/2016    Dexamethasone   • INJECTION OF MEDICATION  01/17/2016    INJECTION FOR NEUROMA   • INJECTION OF MEDICATION      kenalog x2- preformed on 01/17/2016 and 01/20/2011   • NOSE SURGERY  1997   • WRIST SURGERY Left          Current Outpatient Medications:   •  aspirin 81 MG chewable tablet, Chew 81 mg Daily., Disp: , Rfl:   •  Calcium-Magnesium-Zinc 333-133-5 MG tablet, Take  by mouth., Disp: , Rfl:   •  MILK THISTLE PO, Take  by mouth Daily., Disp: , Rfl:   •  Multiple Vitamin (MULTI VITAMIN DAILY PO), Take  by mouth Daily., Disp: , Rfl:   •  Multiple Vitamins-Minerals (EYE HEALTH PO), Take  by mouth Daily., Disp: , Rfl:   •  rosuvastatin (Crestor) 5 MG tablet, Take 1 tablet by mouth Daily., Disp: 90 tablet, Rfl: 3  •  vitamin B-12 (CYANOCOBALAMIN) 1000 MCG tablet, Take 1,000 mcg by mouth Daily., Disp: , Rfl:   •  vitamin B-6 (PYRIDOXINE) 50 MG tablet, Take 100 mg by mouth Daily., Disp: , Rfl:   •  vitamin D3 125 MCG (5000 UT) capsule capsule, Take 5,000  Units by mouth Daily., Disp: , Rfl:   •  fluticasone (FLONASE) 50 MCG/ACT nasal spray, 2 sprays into the nostril(s) as directed by provider Daily for 30 days., Disp: 9 mL, Rfl: 0    Allergies   Allergen Reactions   • Erythromycin Base    • Keflex [Cephalexin] Rash   • Lortab [Hydrocodone-Acetaminophen] Rash   • Orudis [Ketoprofen] Rash   • Other Rash     Phenobarbital and Darvocet-N 100  [place] Block itching   • Sulfa Antibiotics Rash       Family History   Problem Relation Age of Onset   • Cancer Other    • Diabetes Other    • Hyperlipidemia Other    • Stroke Other    • Vision loss Other    • Hyperlipidemia Mother    • Stroke Mother    • Cancer Father    • Diabetes Maternal Grandmother        Social History     Socioeconomic History   • Marital status:    Tobacco Use   • Smoking status: Never     Passive exposure: Past   • Smokeless tobacco: Never   • Tobacco comments:     20 years   Vaping Use   • Vaping Use: Never used   Substance and Sexual Activity   • Alcohol use: Not Currently     Comment: none since a teen   • Drug use: Never   • Sexual activity: Defer       Review of Systems    Vitals:    12/28/22 1052   BP: 100/70   Pulse: 91   Temp: 97.4 °F (36.3 °C)   SpO2: 98%       Physical Exam  Constitutional:       Appearance: Normal appearance.   HENT:      Head: Normocephalic and atraumatic.   Cardiovascular:      Rate and Rhythm: Normal rate and regular rhythm.   Pulmonary:      Effort: Pulmonary effort is normal. No respiratory distress.   Neurological:      General: No focal deficit present.      Mental Status: She is alert and oriented to person, place, and time.   Psychiatric:         Mood and Affect: Mood normal.         Behavior: Behavior normal.       [facility]   [address] [place] [address]   Phone: [phone] or [phone]   Fax: [phone]   Zhao Bettencourt M.D., Medical Director     PATIENT NAME                                     LABORATORY NO.   1800  "CINDY FREIRE.                             [id number]   [id number]                                 AGE                    SEX   N              CLIENT REF #   [facility]                   72        1950      F     [id number]      [id number]     [facility]                               REQUESTING M.D.           ATTENDING M.D.         COPY TO.   [address] [place] [address]                         MIRIAM [place] [address]                     DATE COLLECTED            DATE RECEIVED          DATE REPORTED   2022     DIAGNOSIS:   BREAST, BIOPSY, NEEDLE CORES, LEFT:   Stromal fibrosis with fibroadenomatoid hyperplasia   Negative for atypical hyperplasia or malignancy     CLINICAL HISTORY:   Abnormal mammogram left breast     SPECIMENS RECEIVED:   BREAST, BIOPSY, NEEDLE CORES, LEFT     MICROSCOPIC DESCRIPTION:   Tissue blocks are prepared and slides are examined microscopically on all   specimens. See diagnosis for details.     Professional interpretation rendered by Zhao Bettencourt M.D., F.C.A.P. at   [facility] [employer], [address] [place] [address].     GROSS DESCRIPTION:   Specimen is received in 1 formalin filled container labeled \"left breast\" consists   of multiple pieces of yellow cylindrical soft tissue ranging in length from 2.0 to   3.5 cm, and ranging in diameter from 0.5 to 0.6 cm.  Specimen is submitted   entirely in 4 cassettes.  Cold ischemic time 22 minutes.  Estimated time in   formalin 12 to 13 hours.  [facility]     REVIEWED, DIAGNOSED AND ELECTRONICALLY   SIGNED BY:     Zhao Bettencourt M.D., F.C.A.P.     ASSESSMENT    Diagnoses and all orders for this visit:    1. Abnormal mammogram of left breast (Primary)        PLAN    1.  Patient with benign findings on biopsy of left breast calcifications.  I counseled her on getting a mammogram in 6 months for a new baseline and then " resuming yearly mammograms.  She can follow-up as needed.              This document has been electronically signed by Johann Hameed MD on December 28, 2022 11:04 CST

## 2023-03-06 DIAGNOSIS — E78.2 MIXED HYPERLIPIDEMIA: ICD-10-CM

## 2023-03-06 RX ORDER — ROSUVASTATIN CALCIUM 5 MG/1
5 TABLET, COATED ORAL DAILY
Qty: 90 TABLET | Refills: 3 | Status: SHIPPED | OUTPATIENT
Start: 2023-03-06